# Patient Record
Sex: MALE | URBAN - METROPOLITAN AREA
[De-identification: names, ages, dates, MRNs, and addresses within clinical notes are randomized per-mention and may not be internally consistent; named-entity substitution may affect disease eponyms.]

---

## 2023-10-03 VITALS — BODY MASS INDEX: 30.2 KG/M2 | HEIGHT: 72 IN | WEIGHT: 223 LBS

## 2023-10-03 RX ORDER — METOCLOPRAMIDE 10 MG/1
10 TABLET ORAL ONCE
OUTPATIENT
Start: 2023-10-03 | End: 2023-10-03

## 2023-10-03 RX ORDER — VALSARTAN 40 MG/1
40 TABLET ORAL DAILY
COMMUNITY

## 2023-10-03 RX ORDER — FAMOTIDINE 20 MG/1
20 TABLET, FILM COATED ORAL ONCE
OUTPATIENT
Start: 2023-10-03 | End: 2023-10-03

## 2023-10-03 RX ORDER — NICOTINE POLACRILEX 4 MG
15 LOZENGE BUCCAL
OUTPATIENT
Start: 2023-10-03

## 2023-10-03 RX ORDER — SODIUM CHLORIDE, SODIUM LACTATE, POTASSIUM CHLORIDE, CALCIUM CHLORIDE 600; 310; 30; 20 MG/100ML; MG/100ML; MG/100ML; MG/100ML
INJECTION, SOLUTION INTRAVENOUS CONTINUOUS
OUTPATIENT
Start: 2023-10-03

## 2023-10-03 RX ORDER — NICOTINE POLACRILEX 4 MG
30 LOZENGE BUCCAL
OUTPATIENT
Start: 2023-10-03

## 2023-10-03 RX ORDER — DEXTROSE MONOHYDRATE 25 G/50ML
50 INJECTION, SOLUTION INTRAVENOUS
OUTPATIENT
Start: 2023-10-03

## 2023-10-06 RX ORDER — ACETAMINOPHEN 500 MG
1000 TABLET ORAL ONCE
Status: DISCONTINUED | OUTPATIENT
Start: 2023-10-06 | End: 2023-10-06

## 2023-10-09 ENCOUNTER — HOSPITAL ENCOUNTER (OUTPATIENT)
Dept: MRI IMAGING | Facility: HOSPITAL | Age: 48
Discharge: HOME | End: 2023-10-09
Attending: PAIN MEDICINE
Payer: MEDICAID

## 2023-10-14 VITALS — BODY MASS INDEX: 30.2 KG/M2 | WEIGHT: 223 LBS | HEIGHT: 72 IN

## 2023-10-14 RX ORDER — NICOTINE POLACRILEX 4 MG
15 LOZENGE BUCCAL
Status: CANCELLED | OUTPATIENT
Start: 2023-10-14

## 2023-10-14 RX ORDER — DEXTROSE MONOHYDRATE 25 G/50ML
50 INJECTION, SOLUTION INTRAVENOUS
Status: CANCELLED | OUTPATIENT
Start: 2023-10-14

## 2023-10-14 RX ORDER — SODIUM CHLORIDE, SODIUM LACTATE, POTASSIUM CHLORIDE, CALCIUM CHLORIDE 600; 310; 30; 20 MG/100ML; MG/100ML; MG/100ML; MG/100ML
INJECTION, SOLUTION INTRAVENOUS CONTINUOUS
Status: CANCELLED | OUTPATIENT
Start: 2023-10-14

## 2023-10-14 RX ORDER — NICOTINE POLACRILEX 4 MG
30 LOZENGE BUCCAL
Status: CANCELLED | OUTPATIENT
Start: 2023-10-14

## 2023-10-19 ENCOUNTER — HOSPITAL ENCOUNTER (OUTPATIENT)
Dept: MRI IMAGING | Facility: HOSPITAL | Age: 48
Discharge: HOME | End: 2023-10-19
Attending: PAIN MEDICINE
Payer: MEDICAID

## 2023-12-18 VITALS — WEIGHT: 225 LBS | HEIGHT: 72 IN | BODY MASS INDEX: 30.48 KG/M2

## 2023-12-18 RX ORDER — DEXTROSE MONOHYDRATE 25 G/50ML
50 INJECTION, SOLUTION INTRAVENOUS
OUTPATIENT
Start: 2023-12-18

## 2023-12-18 RX ORDER — NICOTINE POLACRILEX 4 MG
15 LOZENGE BUCCAL
OUTPATIENT
Start: 2023-12-18

## 2023-12-18 RX ORDER — SODIUM CHLORIDE 9 MG/ML
INJECTION, SOLUTION INTRAVENOUS CONTINUOUS
OUTPATIENT
Start: 2023-12-18

## 2023-12-18 RX ORDER — NICOTINE POLACRILEX 4 MG
30 LOZENGE BUCCAL
OUTPATIENT
Start: 2023-12-18

## 2023-12-29 ENCOUNTER — HOSPITAL ENCOUNTER (OUTPATIENT)
Dept: MRI IMAGING | Facility: HOSPITAL | Age: 48
Discharge: HOME | End: 2023-12-29
Attending: PAIN MEDICINE
Payer: MEDICAID

## 2023-12-29 DIAGNOSIS — Z01.818 PRE-OP TESTING: Primary | ICD-10-CM

## 2024-05-13 ENCOUNTER — OFFICE VISIT (OUTPATIENT)
Facility: CLINIC | Age: 49
End: 2024-05-13
Payer: MEDICAID

## 2024-05-13 ENCOUNTER — HOSPITAL ENCOUNTER (OUTPATIENT)
Dept: GENERAL RADIOLOGY | Age: 49
Discharge: HOME | End: 2024-05-13
Attending: FAMILY MEDICINE

## 2024-05-13 VITALS — HEIGHT: 72 IN | BODY MASS INDEX: 29.8 KG/M2 | WEIGHT: 220 LBS

## 2024-05-13 DIAGNOSIS — M51.16 LUMBAR DISC HERNIATION WITH RADICULOPATHY: ICD-10-CM

## 2024-05-13 DIAGNOSIS — M54.50 LOW BACK PAIN, UNSPECIFIED BACK PAIN LATERALITY, UNSPECIFIED CHRONICITY, UNSPECIFIED WHETHER SCIATICA PRESENT: ICD-10-CM

## 2024-05-13 DIAGNOSIS — M54.16 LEFT LUMBAR RADICULOPATHY: Primary | ICD-10-CM

## 2024-05-13 PROCEDURE — 72100 X-RAY EXAM L-S SPINE 2/3 VWS: CPT | Performed by: FAMILY MEDICINE

## 2024-05-13 RX ORDER — DIAZEPAM 5 MG/1
5 TABLET ORAL
Qty: 2 TABLET | Refills: 0 | Status: SHIPPED | OUTPATIENT
Start: 2024-05-13 | End: 2024-05-13

## 2024-05-13 RX ORDER — PREDNISONE 20 MG/1
20 TABLET ORAL DAILY
Qty: 7 TABLET | Refills: 0 | Status: SHIPPED | OUTPATIENT
Start: 2024-05-13 | End: 2024-05-20

## 2024-05-13 NOTE — H&P
Sports Medicine Clinic Note     Subjective:    Chief Complaint   Patient presents with    Back Pain     SCIATICA, RECENT ONSET: 05/06.2023     History of Present Illness: 49-year-old male presents with a history of left-sided radicular leg pain that has been ongoing for over a year, with no specific inciting event or trauma. The patient has previously sought medical advice and was recommended an MRI after failing to improve with physical therapy, though he never underwent the [place] the [place] as his symptoms temporarily improved. Recently, he reports severe left-sided radicular pain accompanied by weakness and numbness in his entire left leg, persisting for the past 6 months. He underwent an MRI in [place], which revealed a disc herniation. He was advised to undergo surgery as soon as possible. He opted to pursue surgical options in the [place] and has brought physical films and an MRI report from [facility] for review.    Objective:    Left Lower Extremity Examination:    Inspection:  The patient ambulates with a slight limp, favoring the right leg.    Palpation:  No palpable masses or deformity. Tenderness localized to the lumbar paraspinal region.    Range of Motion:  Limited due to pain, particularly during lumbar flexion and left lateral bending.    Neurovascular:  Diminished patellar and Achilles reflexes on the left side compared to the right. 4/5 strength in the left lower extremity notably weak in hip flexion abduction extension as well as ankle plantar and dorsiflexion. Sensation diminished to soft touch in the entire LLE compared to right.    Special Tests:  Positive bilateral straight leg raise test with left sided radicular symptoms.    Diagnostic Tests:    Lumbar radiographs obtained today show no fracture or dislocation, no subluxation or disc space narrowing notable on radiographs.  Curvature is within normal limits.  There are some sclerosis of the lower lumbar facet joints suggestive of facet  arthropathy.    Review of the physical MRI films from [place] in the office confirms the presence of a large, diffuse disc bulge at L4-L5 encroaching the lateral recesses bilaterally and abutting the exiting nerve root causing spinal canal stenosis. Similar findings noted at L5-S1.    Assessment:    Lumbar Disc Herniation with Radiculopathy. The chronicity and severity of symptoms, along with chronic neurological deficits, are concerning for permanent damage if not promptly addressed.    Plan:    Additional Imaging/Workup:  Order stat lumbar MRI to assess current status and plan surgical intervention if necessary.    Medications:  Prescribe a prednisone burst to manage inflammation and acute symptoms.    Bracing:  Consider a lumbar support brace to alleviate discomfort and stabilize the spine during activities.    Procedures:  Prepare for possible surgical intervention based on MRI findings and specialist consultation.    Activity Recommendations:  Advise the patient to avoid activities that exacerbate pain, particularly heavy lifting and prolonged sitting or standing. Gentle, guided stretching and strengthening exercises as tolerated.    Follow-Up:  Schedule a follow-up appointment immediately after the MRI results are available, or sooner if symptoms worsen. Arrange referral to a spine surgeon for surgical evaluation based on the MRI findings.        Mesfin Torres DO, CAM   Primary Care Sports Medicine    Department of Orthopaedic Surgery  [facility]    [address] [place] [address]   [address] [place] [address]    t: [phone]  f: [phone]      [facility]

## 2024-05-16 ENCOUNTER — PATIENT MESSAGE (OUTPATIENT)
Facility: CLINIC | Age: 49
End: 2024-05-16

## 2024-05-16 NOTE — TELEPHONE ENCOUNTER
From: Aric Jackson  To: Mesfin MONDRAGON  Sent: 5/16/2024 3:19 PM CDT  Subject: Upcoming MRI    Hi Dr. Torres,    I have an MRI scheduled at [facility] this coming Monday. They need the notes from our appointment for insurance. Can you please have them faxed to [phone]?    Thank you!

## 2024-05-17 ENCOUNTER — TELEPHONE (OUTPATIENT)
Dept: ORTHOPEDICS CLINIC | Facility: CLINIC | Age: 49
End: 2024-05-17

## 2024-05-17 NOTE — TELEPHONE ENCOUNTER
Received fax from [facility] requesting medical records in order to obtain auth from insurance for patient scheduled MRI. 4 pages of records were faxed, and were received

## 2024-05-22 ENCOUNTER — PATIENT MESSAGE (OUTPATIENT)
Facility: CLINIC | Age: 49
End: 2024-05-22

## 2024-05-22 NOTE — TELEPHONE ENCOUNTER
From: Aric Jackson  To: Mesfin MONDRAGON  Sent: 5/22/2024 2:58 PM CDT  Subject: MRI Results    Hello,    I had my MRI on May 20th. I believe [facility] should have sent the findings so I just wanted to double check that you guys received them. I have attached them as well just in case.

## 2024-05-31 ENCOUNTER — OFFICE VISIT (OUTPATIENT)
Facility: CLINIC | Age: 49
End: 2024-05-31
Payer: MEDICAID

## 2024-05-31 ENCOUNTER — PATIENT MESSAGE (OUTPATIENT)
Facility: CLINIC | Age: 49
End: 2024-05-31

## 2024-05-31 VITALS — HEIGHT: 72 IN | WEIGHT: 220 LBS | BODY MASS INDEX: 29.8 KG/M2

## 2024-05-31 DIAGNOSIS — M54.16 LEFT LUMBAR RADICULOPATHY: Primary | ICD-10-CM

## 2024-05-31 DIAGNOSIS — M51.16 LUMBAR DISC HERNIATION WITH RADICULOPATHY: ICD-10-CM

## 2024-05-31 PROCEDURE — 99214 OFFICE O/P EST MOD 30 MIN: CPT | Performed by: FAMILY MEDICINE

## 2024-05-31 RX ORDER — ATORVASTATIN CALCIUM 10 MG/1
10 TABLET, FILM COATED ORAL NIGHTLY
COMMUNITY
Start: 2024-05-03

## 2024-05-31 RX ORDER — BLOOD-GLUCOSE METER
EACH MISCELLANEOUS
COMMUNITY
Start: 2024-05-30

## 2024-05-31 NOTE — TELEPHONE ENCOUNTER
From: Aric Jackson  To: Mesfin MONDRAGON  Sent: 5/31/2024 11:13 AM CDT  Subject: Running Late    Hello,     I am running slightly late as I am coming from [place]. I should not be more than 10 minutes late but I just wanted to let you know in case.     Thank you,  Aric Jackson

## 2024-05-31 NOTE — PROGRESS NOTES
Sports Medicine Clinic Note    Subjective:    Chief Complaint: Follow-up for MRI results and ongoing low back pain radiating into the left lower extremity.    Interval History: Aric Jackson is a 49-year-old male who returns for follow-up regarding his low back pain with left-sided radiculopathy. Since the last visit, he reports persistent and severe left-sided leg pain, weakness, and numbness. He has been taking the prescribed prednisone, which provided mild relief in pain but not neurologic symptoms or weakness. No new injuries or incidents reported. He continues to experience difficulty with daily activities due to pain and weakness in the left leg.    Objective:    Left Lower Extremity Examination:    Inspection:  The patient ambulates with a slight limp, favoring the right leg. No visible swelling or deformity.    Palpation:  Tenderness localized to the lumbar paraspinal region. No palpable masses or deformity noted.    Range of Motion:  Limited due to pain, particularly during lumbar flexion and left lateral bending.    Neurovascular:  Diminished patellar and Achilles reflexes on the left side compared to the right. Strength is 4/5 in the left lower extremity, with notable weakness in hip flexion, abduction, extension, and ankle plantar and dorsiflexion. Sensation diminished to soft touch in the entire left lower extremity compared to the right.    Special Tests:  Positive bilateral straight leg raise test with left-sided radicular symptoms.    Diagnostic Tests:    Review of recent MRI findings from 05/20/2024:  - Lumbar vertebrae normal in height and alignment.  - Mild reduction of disc height and signal with mild degenerative endplate changes and small marginal osteophytes throughout the lumbar spine.  - At L4-L5, central disc protrusion with annular disc fissure, mild to moderate canal stenosis, and moderate right and mild to moderate left foraminal stenosis.  - At L5-S1, left paracentral and subarticular  disc protrusion with annular disc fissure, contacting the left S1 nerve root, mild facet arthropathy, and mild to moderate left and mild right foraminal stenosis.    Assessment:    Lumbar Disc Herniation with Radiculopathy, primarily at L4-L5 and L5-S1 levels, causing moderate foraminal stenosis and likely impingement of the left S1 nerve root.    Plan:    Procedures:  Refer to a spine surgeon for evaluation and potential surgical intervention based on current MRI findings. Discuss possible discectomy or decompression surgery.    Additional Imaging/Workup:  Not indicated at this time.    Medications:  Continue prednisone burst as needed for acute symptom management. Can try neuromodulating medicine at night to help with sleep in interim.    Bracing/Casting:  Can try lumbar support brace to alleviate discomfort and stabilize the spine during activities.    Activity Recommendations:  Advise the patient to avoid activities that exacerbate pain, particularly heavy lifting and prolonged sitting or standing. Gentle, guided stretching and strengthening exercises as tolerated.    Follow-Up:  Referral to spine surgery specialist to be completed promptly for further evaluation and management.        Mesfin Torres DO, CAQSM   Primary Care Sports Medicine    Department of Orthopaedic Surgery  [facility]    [address] [place] [address]  [address] [place] [address]    t: [phone]  f: [phone]      [facility]

## 2024-06-06 ENCOUNTER — OFFICE VISIT (OUTPATIENT)
Dept: ORTHOPEDICS CLINIC | Facility: CLINIC | Age: 49
End: 2024-06-06
Payer: MEDICAID

## 2024-06-06 VITALS — HEIGHT: 72 IN | BODY MASS INDEX: 29.8 KG/M2 | WEIGHT: 220 LBS

## 2024-06-06 DIAGNOSIS — M51.16 LUMBAR DISC HERNIATION WITH RADICULOPATHY: Primary | ICD-10-CM

## 2024-06-06 PROCEDURE — 99205 OFFICE O/P NEW HI 60 MIN: CPT | Performed by: STUDENT IN AN ORGANIZED HEALTH CARE EDUCATION/TRAINING PROGRAM

## 2024-06-06 RX ORDER — GABAPENTIN 300 MG/1
300 CAPSULE ORAL NIGHTLY
Qty: 30 CAPSULE | Refills: 0 | Status: SHIPPED | OUTPATIENT
Start: 2024-06-06 | End: 2024-07-06

## 2024-06-06 NOTE — H&P
[facility] - ORTHOPEDICS  [address], [address] [address]  [address] [place] [address]  [phone]     NEW PATIENT VISIT - HISTORY AND PHYSICAL EXAMINATION     Name: Aric Jackson   MRN: [id number]  Date: 06/06/24       CC: back and left leg pain/numbness/weakness    REFERRED BY: Ortiz Handley DO    HPI:   Aric Jackson is a very pleasant 49 year old male who presents today for evaluation of back and leg pain. The distribution of symptoms are: 20% backpain and 80% leg pain. The symptoms began many year(s) ago without any significant injury. Since the onset, the symptoms have wax and waned over time. Patient feels pain is aggravated by bending and improved by repositioning. The patient reports  numbness and  weakness.  The symptom characteristics are as follows: Patient is a 49-year-old male presenting with low back pain radiating down left lower extremity, associated with numbness, tingling, weakness in the left foot.  Patient symptoms started 3 years ago and has had multiple flareup episodes.  Symptoms have been ongoing for the past year.  Patient had physical therapy in September of last year with resolution.  Patient had been evaluated by pain clinic as well as Dr. Blackburn.   Has persistent numbness in left L4 and L5 nerve distributions, associated with foot weakness.     Prior spine surgery: none.    Bowel and bladder symptoms: absent.    The patient has not had issues with balance and/or hand dexterity problems such as changes in penmanship or the use of buttons or zippers.    Treatment up to this time has included:    Evaluation: PCP  NSAIDS: have been partially helpful  Narcotic use: None  Physical therapy: last year  Spinal injections: None  Others:       PMH:   Past Medical History:    Back problem    Diabetes (HCC)    High blood pressure    High cholesterol       PAST SURGICAL HX:  History reviewed. No pertinent surgical history.    FAMILY  HX:  History reviewed. No pertinent family history.    ALLERGIES:  Patient has no known allergies.    MEDICATIONS:   Current Outpatient Medications   Medication Sig Dispense Refill    gabapentin 300 MG Oral Cap Take 1 capsule (300 mg total) by mouth nightly. 30 capsule 0    atorvastatin 10 MG Oral Tab Take 1 tablet (10 mg total) by mouth nightly.      Blood Glucose Monitoring Suppl (ONETOUCH ULTRA 2) w/Device Does not apply Kit       metFORMIN HCl 1000 MG Oral Tab Take 1 tablet (1,000 mg total) by mouth 2 (two) times daily with meals.      valsartan 40 MG Oral Tab Take 1 tablet (40 mg total) by mouth daily.         ROS: A comprehensive 14 point review of systems was performed and was negative aside from the aforementioned per history of present illness.    SOCIAL HX:  Social History     Tobacco Use    Smoking status: Never    Smokeless tobacco: Never   Substance Use Topics    Alcohol use: Not Currently         PE:   Vitals:    06/06/24 1330   Weight: 220 lb (99.8 kg)   Height: 6' (1.829 m)     Estimated body mass index is 29.84 kg/m² as calculated from the following:    Height as of this encounter: 6' (1.829 m).    Weight as of this encounter: 220 lb (99.8 kg).    Physical Exam  Constitutional:       Appearance: Normal appearance.   HENT:      Head: Normocephalic and atraumatic.   Eyes:      Extraocular Movements: Extraocular movements intact.   Cardiovascular:      Pulses: Normal pulses. Skin warm and well perfused.  Pulmonary:      Effort: Pulmonary effort is normal. No respiratory distress.   Skin:     General: Skin is warm.   Psychiatric:         Mood and Affect: Mood normal.     Spine Exam:    Normal gait without difficulty  Able to heel, toe, tandem gait without difficulty  Level shoulders and hips in even stance    Restricted L-spine ROM    No tenderness to palpation of L-spine    Straight leg raise test: negative    Sustained clonus: negative    LE Strength: 5/5 IP QUAD TA EHL GSC on the right, 4/5 TA, EHL  on the left  LE Sensation: decreased in left L4 and L5 distributions, otherwise normal in L2-S1 distribution  LE reflexes: normal    Radiographic Examination/Diagnostics:  MRI personally viewed, independently interpreted and radiology report was reviewed.  MRI of the lumbar spine demonstrates central disc herniation at L4-5 with moderate canal stenosis as well as lateral recess stenosis    IMPRESSION: Aric Jackson is a 49 year old male with lumbar stenosis and radiculopathy    PLAN:     We reviewed the patients history, symptoms, exam findings, and imaging today.  We had a detailed discussion outlining the etiology, anatomy, pathophysiology, and natural history of lumbar radiculopathy. The typical management of this condition may include lifestyle modification, NSAIDs, physical therapy, oral steroids, epidural injections, neuromodulatory medications, and sometimes pain medications.     The patient has participated in extensive conservative management and has failed gain any significant improvement in his symptoms over the last 12 months.  The symptoms have failed to respond to conservative measures for greater than 3 months now, to include: prescription strength analgesics and active documented physical therapy or therapeutic exercises including stretching and strengthening.  The patient does not have any cognitive or behavioral issues requiring further evaluation or management.      In addition to the above, he has significant functional impairment and is unable to perform activities of daily living.      We discussed the risks, benefits, and alternatives of treatment.  The patient fully understands the nature of his medical condition and fully understands the nature of the proposed surgical treatment.  I have fully explained all possible alternative treatments or procedures and the patient understands the significant risks involved in the proposed treatment, as well as the risks involved and benefits of all  alternative treatments and procedures.     Plan: Left L4-5 decompression    Patient can call to schedule      Ricardo Jones MD  Orthopedic Spine Surgeon  [facility]   [address] [place] [address], [address], [address].[facility]  [email or web address]  t: [phone]   f: [phone]        This note was dictated using Dragon software.  While it was briefly proofread prior to completion, some grammatical, spelling, and word choice errors due to dictation may still occur.

## 2024-06-07 ENCOUNTER — TELEPHONE (OUTPATIENT)
Dept: ORTHOPEDICS CLINIC | Facility: CLINIC | Age: 49
End: 2024-06-07

## 2024-06-07 ENCOUNTER — MED REC SCAN ONLY (OUTPATIENT)
Dept: ORTHOPEDICS CLINIC | Facility: CLINIC | Age: 49
End: 2024-06-07

## 2024-06-07 DIAGNOSIS — M51.16 LUMBAR DISC HERNIATION WITH RADICULOPATHY: Primary | ICD-10-CM

## 2024-06-07 NOTE — TELEPHONE ENCOUNTER
SURGERY SCHEDULING SHEET    Aric Jackson  1/1/1975  [id number]    Procedure: Left L4-5 laminotomy, discectomy (55085)    Diagnosis: Lumbar stenosis with radiculopathy    Anesthesia: General    Length of Surgery: 2 hrs    Disposition: Outpatient    Assist: ALANNA Segura    OR Table: Doc    Position: Prone    Implant:  None    C-Arm: Yes    Special Equipment: None    Neuromonitoring: None    Pre-op Testing: PER ANESTHESIA GUIDELINES    Clearance: OTHER:  PCP    Post op: 2 weeks post op    Home health: [facility]    Ricardo Jones MD  [facility] Orthopedic Surgery  Phone: [phone]  Fax: [phone]

## 2024-06-07 NOTE — TELEPHONE ENCOUNTER
Date of Surgery: 7/3/24       Post Op Appt:  7/18/24 @0230    Case ID: [id number]    Notes: RH : YES    Navigator Appt: 06/21/24 @ 1115      SURGERY SCHEDULING SHEET     Aric Jackson  1/1/1975  [id number]     Procedure: Left L4-5 laminotomy, discectomy (00159)     Diagnosis: Lumbar stenosis with radiculopathy     Anesthesia: General     Length of Surgery: 2 hrs     Disposition: Outpatient     Assist: ALANNA Segura     OR Table: Doc     Position: Prone     Implant:  None     C-Arm: Yes     Special Equipment: None     Neuromonitoring: None     Pre-op Testing: PER ANESTHESIA GUIDELINES     Clearance: OTHER:  PCP     Post op: 2 weeks post op     Home health: [facility]     Ricardo Jones MD  [facility] Orthopedic Surgery  Phone: [phone]  Fax: [phone]

## 2024-06-07 NOTE — TELEPHONE ENCOUNTER
Spoke with Aric's daughter Belkys in regards to getting him scheduled for surgery. I did discuss optional surgical dates with her. She did decide to proceed with surgery for him on 7/3/24. I confirmed that surgery will take place at [facility]. I also discussed the surgical protocol and scheduled his post op and spine navigator appt. She states understanding with no questions or concerns. Advised to call the office back if they have any questions or concerns.         Can you send the booking sheet over please?

## 2024-06-13 ENCOUNTER — TELEPHONE (OUTPATIENT)
Dept: FAMILY MEDICINE CLINIC | Facility: CLINIC | Age: 49
End: 2024-06-13

## 2024-06-21 ENCOUNTER — LABORATORY ENCOUNTER (OUTPATIENT)
Dept: LAB | Age: 49
End: 2024-06-21
Attending: STUDENT IN AN ORGANIZED HEALTH CARE EDUCATION/TRAINING PROGRAM

## 2024-06-21 ENCOUNTER — NURSE ONLY (OUTPATIENT)
Dept: INTERNAL MEDICINE CLINIC | Facility: CLINIC | Age: 49
End: 2024-06-21

## 2024-06-21 ENCOUNTER — TELEPHONE (OUTPATIENT)
Dept: INTERNAL MEDICINE CLINIC | Facility: CLINIC | Age: 49
End: 2024-06-21

## 2024-06-21 ENCOUNTER — NURSE ONLY (OUTPATIENT)
Dept: SURGERY | Facility: CLINIC | Age: 49
End: 2024-06-21

## 2024-06-21 ENCOUNTER — OFFICE VISIT (OUTPATIENT)
Dept: INTERNAL MEDICINE CLINIC | Facility: CLINIC | Age: 49
End: 2024-06-21

## 2024-06-21 VITALS
HEART RATE: 79 BPM | HEIGHT: 72 IN | WEIGHT: 231 LBS | SYSTOLIC BLOOD PRESSURE: 119 MMHG | BODY MASS INDEX: 31.29 KG/M2 | DIASTOLIC BLOOD PRESSURE: 82 MMHG

## 2024-06-21 DIAGNOSIS — Z71.9 ENCOUNTER FOR EDUCATION: Primary | ICD-10-CM

## 2024-06-21 DIAGNOSIS — M51.16 LUMBAR DISC HERNIATION WITH RADICULOPATHY: ICD-10-CM

## 2024-06-21 DIAGNOSIS — E66.09 CLASS 1 OBESITY DUE TO EXCESS CALORIES WITHOUT SERIOUS COMORBIDITY WITH BODY MASS INDEX (BMI) OF 31.0 TO 31.9 IN ADULT: ICD-10-CM

## 2024-06-21 DIAGNOSIS — Z00.00 ANNUAL PHYSICAL EXAM: ICD-10-CM

## 2024-06-21 DIAGNOSIS — E53.8 VITAMIN B12 DEFICIENCY: ICD-10-CM

## 2024-06-21 DIAGNOSIS — E55.9 VITAMIN D DEFICIENCY: ICD-10-CM

## 2024-06-21 DIAGNOSIS — E11.59 HYPERTENSION ASSOCIATED WITH DIABETES (HCC): ICD-10-CM

## 2024-06-21 DIAGNOSIS — R09.89 FIRM LYMPH NODE: ICD-10-CM

## 2024-06-21 DIAGNOSIS — E11.69 TYPE 2 DIABETES MELLITUS WITH OTHER SPECIFIED COMPLICATION, UNSPECIFIED WHETHER LONG TERM INSULIN USE (HCC): ICD-10-CM

## 2024-06-21 DIAGNOSIS — E11.69 HYPERLIPIDEMIA ASSOCIATED WITH TYPE 2 DIABETES MELLITUS (HCC): ICD-10-CM

## 2024-06-21 DIAGNOSIS — E11.9 TYPE 2 DIABETES MELLITUS WITHOUT COMPLICATION, WITHOUT LONG-TERM CURRENT USE OF INSULIN (HCC): ICD-10-CM

## 2024-06-21 DIAGNOSIS — E78.5 HYPERLIPIDEMIA ASSOCIATED WITH TYPE 2 DIABETES MELLITUS (HCC): ICD-10-CM

## 2024-06-21 DIAGNOSIS — M54.16 SPINAL STENOSIS OF LUMBAR REGION WITH RADICULOPATHY: ICD-10-CM

## 2024-06-21 DIAGNOSIS — M48.061 SPINAL STENOSIS OF LUMBAR REGION WITH RADICULOPATHY: ICD-10-CM

## 2024-06-21 DIAGNOSIS — Z76.89 ENCOUNTER TO ESTABLISH CARE WITH NEW DOCTOR: Primary | ICD-10-CM

## 2024-06-21 DIAGNOSIS — I15.2 HYPERTENSION ASSOCIATED WITH DIABETES (HCC): ICD-10-CM

## 2024-06-21 DIAGNOSIS — Z12.11 ENCOUNTER FOR SCREENING COLONOSCOPY: ICD-10-CM

## 2024-06-21 PROBLEM — E66.811 CLASS 1 OBESITY DUE TO EXCESS CALORIES WITHOUT SERIOUS COMORBIDITY WITH BODY MASS INDEX (BMI) OF 31.0 TO 31.9 IN ADULT: Status: ACTIVE | Noted: 2024-06-21

## 2024-06-21 LAB
ALBUMIN SERPL-MCNC: 4.7 G/DL (ref 3.2–4.8)
ALBUMIN/GLOB SERPL: 1.6 {RATIO} (ref 1–2)
ALP LIVER SERPL-CCNC: 54 U/L
ALT SERPL-CCNC: 40 U/L
ANION GAP SERPL CALC-SCNC: 7 MMOL/L (ref 0–18)
ANTIBODY SCREEN: NEGATIVE
APTT PPP: 30.2 SECONDS (ref 23–36)
AST SERPL-CCNC: 22 U/L (ref ?–34)
ATRIAL RATE: 67 BPM
BASOPHILS # BLD AUTO: 0.07 X10(3) UL (ref 0–0.2)
BASOPHILS NFR BLD AUTO: 0.8 %
BILIRUB SERPL-MCNC: 0.4 MG/DL (ref 0.3–1.2)
BUN BLD-MCNC: 13 MG/DL (ref 9–23)
BUN/CREAT SERPL: 13.1 (ref 10–20)
CALCIUM BLD-MCNC: 10.1 MG/DL (ref 8.7–10.4)
CARTRIDGE EXPIRATION DATE: ABNORMAL DATE
CHLORIDE SERPL-SCNC: 108 MMOL/L (ref 98–112)
CHOLEST SERPL-MCNC: 137 MG/DL (ref ?–200)
CO2 SERPL-SCNC: 26 MMOL/L (ref 21–32)
CREAT BLD-MCNC: 0.99 MG/DL
CREAT UR-SCNC: 82.3 MG/DL
DEPRECATED RDW RBC AUTO: 43.7 FL (ref 35.1–46.3)
EGFRCR SERPLBLD CKD-EPI 2021: 92 ML/MIN/1.73M2 (ref 60–?)
EOSINOPHIL # BLD AUTO: 0.33 X10(3) UL (ref 0–0.7)
EOSINOPHIL NFR BLD AUTO: 4 %
ERYTHROCYTE [DISTWIDTH] IN BLOOD BY AUTOMATED COUNT: 13.3 % (ref 11–15)
FASTING PATIENT LIPID ANSWER: YES
FASTING STATUS PATIENT QL REPORTED: YES
GLOBULIN PLAS-MCNC: 2.9 G/DL (ref 2–3.5)
GLUCOSE BLD-MCNC: 153 MG/DL (ref 70–99)
HCT VFR BLD AUTO: 44.1 %
HDLC SERPL-MCNC: 45 MG/DL (ref 40–59)
HEMOGLOBIN A1C: 7.5 % (ref 4.3–5.6)
HGB BLD-MCNC: 15 G/DL
IMM GRANULOCYTES # BLD AUTO: 0.02 X10(3) UL (ref 0–1)
IMM GRANULOCYTES NFR BLD: 0.2 %
INR BLD: 1 (ref 0.8–1.2)
LDLC SERPL CALC-MCNC: 78 MG/DL (ref ?–100)
LYMPHOCYTES # BLD AUTO: 3.55 X10(3) UL (ref 1–4)
LYMPHOCYTES NFR BLD AUTO: 42.7 %
MCH RBC QN AUTO: 30.5 PG (ref 26–34)
MCHC RBC AUTO-ENTMCNC: 34 G/DL (ref 31–37)
MCV RBC AUTO: 89.8 FL
MICROALBUMIN UR-MCNC: <0.3 MG/DL
MONOCYTES # BLD AUTO: 0.6 X10(3) UL (ref 0.1–1)
MONOCYTES NFR BLD AUTO: 7.2 %
NEUTROPHILS # BLD AUTO: 3.74 X10 (3) UL (ref 1.5–7.7)
NEUTROPHILS # BLD AUTO: 3.74 X10(3) UL (ref 1.5–7.7)
NEUTROPHILS NFR BLD AUTO: 45.1 %
NONHDLC SERPL-MCNC: 92 MG/DL (ref ?–130)
OSMOLALITY SERPL CALC.SUM OF ELEC: 295 MOSM/KG (ref 275–295)
P AXIS: 27 DEGREES
P-R INTERVAL: 188 MS
PLATELET # BLD AUTO: 390 10(3)UL (ref 150–450)
POTASSIUM SERPL-SCNC: 4.5 MMOL/L (ref 3.5–5.1)
PROT SERPL-MCNC: 7.6 G/DL (ref 5.7–8.2)
PROTHROMBIN TIME: 13.8 SECONDS (ref 11.6–14.8)
Q-T INTERVAL: 366 MS
QRS DURATION: 92 MS
QTC CALCULATION (BEZET): 386 MS
R AXIS: 35 DEGREES
RBC # BLD AUTO: 4.91 X10(6)UL
RH BLOOD TYPE: POSITIVE
SODIUM SERPL-SCNC: 141 MMOL/L (ref 136–145)
T AXIS: 21 DEGREES
TRIGL SERPL-MCNC: 69 MG/DL (ref 30–149)
TSI SER-ACNC: 1.39 MIU/ML (ref 0.55–4.78)
VENTRICULAR RATE: 67 BPM
VIT B12 SERPL-MCNC: 385 PG/ML (ref 211–911)
VIT D+METAB SERPL-MCNC: 28.4 NG/ML (ref 30–100)
VLDLC SERPL CALC-MCNC: 11 MG/DL (ref 0–30)
WBC # BLD AUTO: 8.3 X10(3) UL (ref 4–11)

## 2024-06-21 PROCEDURE — 86900 BLOOD TYPING SEROLOGIC ABO: CPT

## 2024-06-21 PROCEDURE — 82607 VITAMIN B-12: CPT

## 2024-06-21 PROCEDURE — 82043 UR ALBUMIN QUANTITATIVE: CPT

## 2024-06-21 PROCEDURE — 84443 ASSAY THYROID STIM HORMONE: CPT

## 2024-06-21 PROCEDURE — 83036 HEMOGLOBIN GLYCOSYLATED A1C: CPT | Performed by: STUDENT IN AN ORGANIZED HEALTH CARE EDUCATION/TRAINING PROGRAM

## 2024-06-21 PROCEDURE — 82306 VITAMIN D 25 HYDROXY: CPT

## 2024-06-21 PROCEDURE — 36415 COLL VENOUS BLD VENIPUNCTURE: CPT

## 2024-06-21 PROCEDURE — 99386 PREV VISIT NEW AGE 40-64: CPT | Performed by: STUDENT IN AN ORGANIZED HEALTH CARE EDUCATION/TRAINING PROGRAM

## 2024-06-21 PROCEDURE — 86850 RBC ANTIBODY SCREEN: CPT

## 2024-06-21 PROCEDURE — 85025 COMPLETE CBC W/AUTO DIFF WBC: CPT

## 2024-06-21 PROCEDURE — 80061 LIPID PANEL: CPT

## 2024-06-21 PROCEDURE — 92229 IMG RTA DETC/MNTR DS POC ALY: CPT | Performed by: STUDENT IN AN ORGANIZED HEALTH CARE EDUCATION/TRAINING PROGRAM

## 2024-06-21 PROCEDURE — 86901 BLOOD TYPING SEROLOGIC RH(D): CPT

## 2024-06-21 PROCEDURE — 85610 PROTHROMBIN TIME: CPT

## 2024-06-21 PROCEDURE — 82570 ASSAY OF URINE CREATININE: CPT

## 2024-06-21 PROCEDURE — 80053 COMPREHEN METABOLIC PANEL: CPT

## 2024-06-21 PROCEDURE — 93005 ELECTROCARDIOGRAM TRACING: CPT

## 2024-06-21 PROCEDURE — 93010 ELECTROCARDIOGRAM REPORT: CPT | Performed by: INTERNAL MEDICINE

## 2024-06-21 PROCEDURE — 85730 THROMBOPLASTIN TIME PARTIAL: CPT

## 2024-06-21 RX ORDER — LANCETS 33 GAUGE
1 EACH MISCELLANEOUS 3 TIMES DAILY
COMMUNITY
Start: 2024-05-30

## 2024-06-21 RX ORDER — BLOOD SUGAR DIAGNOSTIC
STRIP MISCELLANEOUS 3 TIMES DAILY
COMMUNITY

## 2024-06-21 NOTE — TELEPHONE ENCOUNTER
Left message for patient,  He has appt today with Dr Norton at 10:20. Per Dr Norton we will need to do an est care/physical today. But we will not be able to do pre op in first visit. As he is a new patient to us. We can then schedule pre op next week. (I did receive pre op paperwork already) If pt calls back please let him know   Vermilion Border Text: The closure involved the vermilion border.

## 2024-06-21 NOTE — PATIENT INSTRUCTIONS
[employer]  What is Type 2 Diabetes?  Watch this clip to understand what happens within your body when you have type 2 diabetes, and the importance of keeping your blood glucose levels within a healthy range.  To watch the video:  Scan the QR code  Using your mobile device, scan the following code:  OR  Go to the website:  timeplazza  Enter the prescription code:  1576E    © 3144-1409 [employer]. All rights reserved. This information is not intended as a substitute for professional medical care. Always follow your healthcare professional's instructions.    Managing Type 2 Diabetes  Type 2 diabetes is a long-term (chronic) condition. Managing diabetes may mean making some tough changes. Yourhealthcare team can help you.  To manage type 2 diabetes, you'll need to balance your medicine with diet and activity. You will also need check your blood sugar. And work with yourhealthcare provider to prevent complications.    Take your medicine  You may take pills or give yourself insulin shots for diabetes. Or you may use both. Take your medicines or give yourself insulin at the right times to help you control your blood sugar. Think about ways that will help you remember to take your medicines the right way every day. Ask your healthcare provider orteam for ideas.  You may only take pills for your diabetes. But this may change. Over time, mostpeople with type 2 diabetes also need insulin.  Eat healthy  A healthy diet helps control the amount of sugar in your blood. It also helps you stay at a healthy weight. Or it helps you lose weight, if if you'reoverweight. Extra weight makes it harder to control diabetes.  Your healthcare team will help you create a plan that works for you. You don'thave to give up all the foods you like. Have meals and snacks with:  Vegetables  Fruits  Lean meats or other healthy proteins  Whole grains  Low- or nonfat dairy products     Be physically  active  Being active helps lower your blood sugar. Activity helps your body use insulinto turn food into energy. It also helps you manage your weight:  Ask your healthcare provider to help you to make an activity program that's right for you. Your program is based on your age, general health, and types of activity you enjoy. Start slow. But aim for at least 150 minutes of exercise or activity each week. Start with 30 minutes a day. Exercise in 10-minute blocks. Don’t let more than 2 days go by without being active.  Check your blood sugar  Checking your own blood sugar may be a regular part of your care. Or you may only need to check your blood sugar from time to time. Your healthcare provider will tell you how to check your blood sugar at home. Checking it tells you if your blood sugar is in your target range. Having your blood sugars within thetarget range means that you are managing your diabetes well.  If your blood sugar levels are too high or too low, your healthcare provider may suggest changes to your diet or activity level. He or she may also adjustyour medicine.  Your healthcare provider may also tell you to check your blood sugar more oftenwhen you are sick.  Take care of yourself  When you have diabetes, you may be more likely to get other health problems. They include foot, eye, heart, nerve, and kidney problems. You can help prevent these problems by controlling your blood sugar and taking good care of yourself. Your healthcare provider, nurse, diabetes educator, and others canhelp you with the following:  Checkups. You should have regular checkups with your healthcare provider. At those visits, you will have a physical exam that includes checking your feet. Your healthcare provider will also check your blood pressure and weight. Take your shoes off before your appointment starts to be sure your feet are checked.  Other exams. You'll also need eye, foot, and dental exams at least once each year.  Lab  tests. You will have blood and urine tests:  Your healthcare provider will check your hemoglobin A1C at least twice a year. This blood test shows how well you have been controlling your blood sugar over 2 to 3 months. The results help your healthcare provider manage your diabetes.    You will also have other lab tests. For example, to check for kidney problems and abnormal cholesterol levels.  Smoking. If you smoke, you will need to quit. Smoking makes it more likely to get complications from diabetes. Ask your healthcare provider about ways to quit. Also don't use e-cigarette, or vaping, products.  Vaccines. Get a yearly flu shot. And ask your healthcare provider about vaccines to prevent pneumonia, shingles, and hepatitis B.  Stress and depression  Most people have challenges throughout their lives. Living with diabetes can increase your stress. Feeling stressed or depressed can actually affect yourblood sugar levels.  Tell your healthcare provider if you are having trouble coping with diabetes.He or she can help or refer you to other providers or programs.  To learn more  Know where you can get help. You can try the following:  Support. Ask family and friends to support your efforts to take care of yourself. Or look for a diabetes support group nearby or on the internet. Check the Connect with Others link on www.diabetes.org.  Counseling. Talk with a , psychologist, psychiatrist, or other counselor.  Information. Contact the American Diabetes Association at 241-682-5721 or www.diabetes.org  [place] last reviewed this educational content on11/1/2019    © 8411-6215 [employer]. All rights reserved. This information is not intended as a substitute for professional medical care. Always follow yourhealthcare professional's instructions.    Type 2 Diabetes and Food Choices  You make food choices every day. Whole wheat or white bread? A side of French fries or fresh fruit? Eat now or later?  Choices about what, when, and how much you eat affect your blood sugar (glucose), and also your blood pressure and cholesterol. Understanding how food affects blood glucose is the first step in managing diabetes. And following a diabetesmeal plan can help you keep your blood glucose levels on track.   Prevent problems  Having type 2 diabetes means that your body doesn’t control blood glucose well. When blood glucose stays too high for too long, serious health problems can develop. It's important to control your blood glucose through diet, exercise, and medicine. This can delay or prevent kidney,eye, nerve, and heart disease, and other complications of diabetes.   Control carbohydrates  Carbohydrates are foods that have the biggest effect on your blood glucose levels. After you eat carbohydrates, your blood glucose rises. Fruit, sweet foods and drinks, starchy foods (such as bread, potatoes, and rice), and milk and milk products contain carbohydrates. Carbohydrates are important for health. But when you eat too many at once, your blood glucose can go too high. This is even more likely if you don't have or take enough insulin forthat food.   Some carbohydrates may raise blood glucose more than others. These include potatoes, sweets, and white bread. Better choices are less processed foods with more fiber and nutrients. Good options are 100% whole-wheat bread, oatmeal,brown rice, and nonstarchy vegetables.   Learn to use food labels that show added sugar. And try to find healthierchoices, particularly if you are overweight.    Food and medicine  Insulin helps glucose move from the blood into your muscle cells, where it can be used for energy. Some diabetes medicines that are taken by mouth help you make more insulin. Or they help your insulin work more efficiently. So your medicines and food plan have to work together. If you take insulin shots, you need to be very careful to match the amount of carbohydrates you eat  with your insulin dose. If you have too many carbohydrates without adjusting your insulin dose, your blood glucose might become too high. If you have too few carbohydrates, your blood glucose might be too low. Your healthcare provider ora dietitian can help you match your food choices to your medicine.   Have a meal plan  With certain medicines, it's best to eat the same amount of food at the same time every day. That keeps your glucose levels stable. And it helps your medicine work best. Physical activity is an important way to control blood glucose, too. Try to exercise at the same time every day. That way you can build the extra calories you need for exercise into your meal plan. With othermedicines, you may have more choices about how much you eat and when.   If you want to change your medicine to better fit your lifestyle, talk withyour healthcare provider.   Eat smart  You can eat the same foods as everyone else, but you have to carefully watch for certain details. That’s where your diabetes meal plan comes in. A personal meal plan tells you the time of day to eat meals and snacks, the types of food to eat, and how much. Itshould include your favorite foods. And it should focus on these healthy foods:   Whole grains, such as 100% whole-wheat bread, brown rice, and oatmeal  Nonfat or low-fat dairy products, such as nonfat milk and yogurt (but be sure these products don't have sugar added to make up for the fat removed)  Lean meats, poultry, fish, eggs, and dried beans and peas  Foods and drinks with no added sugar  Fruits and vegetables  At first, it may be helpful to use measuring cups and spoons to make sure you’re really eating the amount of food that’s in your plan. You can also use standardized portion sizes on food labels, such as 1 serving of meat being the size of a deck of cards. By checking your blood glucose 1 to 2 hours after eating, you can learn how your food choicesaffect your blood glucose.   To  create a diabetes meal plan or change a plan that’s not working for you, see a dietitian or diabetes educator. Let them know if you have any new health concerns or if your medicines have changed. Having ameal plan that you can live with will keep you at your healthy best.     © 0020-6693 [employer]. All rights reserved. This information is not intended as a substitute for professional medical care. Always follow yourhealthcare professional's instructions.    Diabetes: Caring for Your Body   When you have diabetes, your body needs special care. This care helps you stay healthy and prevent problems. Exercise and healthy eating are a part of this. You can also protect yourself by taking special care of your feet, skin, teeth,and eyes.   Caring for your feet     Follow these tips to help keep your feet healthy:  Check your feet every day for redness, blisters, cracks, dry skin, or numbness. Use a mirror to check the bottoms of your feet, if needed. Or ask for help.  Wash your feet in warm (not hot) water. Don’t soak them.  Use an emery board to keep your toenails even with the ends of your toes. File away sharp edges. A foot doctor (podiatrist) may need to cut your toenails for you.  Smooth down calluses gently or wait until your next podiatry appointment.  Keep your skin soft and smooth by putting a thin layer of skin lotion on the tops and bottoms of your feet. Don't put lotion in between your toes.  Always wear shoes or slippers, even inside your home. Make sure that shoes are correctly fitted, not too tight and not too loose. This can cause friction and rubbing of your feet. Change your socks daily. Always check shoes for foreign objects before putting them on.  Call your healthcare provider right away if your feet are numb or painful. Also call your provider if a cut or sore doesn’t heal in a few days.  Preventing skin infections   To prevent skin infections, bathe every day, but use a moderate water  temperature.. Dry yourself well, especially between your toes. Try to keep your home on the humid side during the colder months of the year to prevent your skin getting dry. Wash any cuts with warm, soapy water. Cover with a sterile bandage. Call your healthcare provider if a cut or sore doesn't heal in a fewdays, feels warm, itches, is swollen, or has a bad smell.   Caring for your teeth   Follow these guidelines for healthy teeth:  Brush your teeth twice daily.  Floss your teeth daily.  See your dentist at least twice yearly.  Keep your blood sugar in a good range.  Caring for your eyes  Have your eyes checked every year by an optometrist or ophthalmologist. Letyour healthcare provider know if you experience any of the following symptoms:   Blurred vision  Dark spots or \"holes\"  Flashes of light  Seeing more floaters than usual  Poor night vision  If you smoke, quit  Smoking is dangerous for everyone, especially people with diabetes. It can harm the blood vessels in your eyes, kidneys, nerves, and heart. It raises blood pressure. Smoking can also slow healing, so infections are more likely. Askyour healthcare provider about programs to help you stop smoking.   [employer] last reviewed this educational content on12/1/2021 © 2000-2022 [employer]. All rights reserved. This information is not intended as a substitute for professional medical care. Always follow yourhealthcare professional's instructions.

## 2024-06-21 NOTE — PROGRESS NOTES
History of Present Illness   Patient ID: Aric Jackson is a 49 year old male.  Chief Complaint: Physical      Aric Jackson is a pleasant 49 year old male with PMHx of Type 2 DM, HLD, HTN Lumbar radiculop who presents to establish care and for annual physical exam. Aric Jackson is doing well today except for chronic left lower lumbar pain with radiculopathy.       A1C today is 7.5 on metoformin 1gm BID for +15 years. Back in [place] in 2023 was on glipizde-gliptan combination pill 5-100 but off for well over 1 year    Ranges at home 120-140 fasting.     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    Health Maintenance  - All care gaps addressed with patient.   Health Maintenance Due   Topic Date Due    Annual Physical  Never done    Diabetes Care Foot Exam  Never done    Diabetes Care A1C  Never done    Diabetes Care Dilated Eye Exam  Never done    Colorectal Cancer Screening  Never done    Diabetes Care: GFR  Never done    Diabetes Care: Microalb/Creat Ratio  Never done    Pneumococcal Vaccine: Birth to 64yrs (1 of 2 - PCV) Never done    PSA  Never done    COVID-19 Vaccine (1 - 2023-24 season) Never done     Review of Systems  Review of Systems   Constitutional: Negative.    Respiratory: Negative.     Cardiovascular: Negative.    Gastrointestinal: Negative.    Skin: Negative.    Neurological: Negative.        Physical Exam  Vitals:    06/21/24 1017   BP: 119/82   Pulse: 79   Weight: 231 lb (104.8 kg)   Height: 6' (1.829 m)     Body mass index is 31.33 kg/m².  BP Readings from Last 3 Encounters:   06/21/24 119/82     Physical Exam  Vitals reviewed.   Constitutional:       Appearance: He is well-developed.   HENT:      Head: Normocephalic and atraumatic.    Eyes:      Extraocular Movements: Extraocular movements intact.      Pupils: Pupils are equal, round, and reactive to light.   Cardiovascular:      Rate and Rhythm: Normal rate and regular rhythm.      Heart sounds: Normal heart sounds.   Pulmonary:      Effort: Pulmonary effort is normal.      Breath sounds: Normal breath sounds.   Abdominal:      General: Bowel sounds are normal.      Palpations: Abdomen is soft.      Tenderness: There is no abdominal tenderness.   Musculoskeletal:         General: Normal range of motion.   Feet:      Right foot:      Protective Sensation: 5 sites tested.  5 sites sensed.      Skin integrity: Skin integrity normal.      Left foot:      Protective Sensation: 5 sites tested.  5 sites sensed.      Skin integrity: Skin integrity normal.      Comments: Bilateral barefoot skin diabetic exam is normal, visualized feet and the appearance is normal.  Bilateral monofilament/sensation of both feet is normal.  Pulsation pedal pulse exam of both lower legs/feet is normal as well.      Bilateral barefoot skin diabetic exam is abnormal with dystrophic nails and/or dry skin   Skin:     General: Skin is warm and dry.   Neurological:      Mental Status: He is alert and oriented to person, place, and time.      Deep Tendon Reflexes: Reflexes are normal and symmetric.           Labs & Imaging  Pertinent labs and imaging reviewed.   No results found for: \"GLU\", \"BUN\", \"BUNCREA\", \"CREATSERUM\", \"ANIONGAP\", \"GFR\", \"GFRNAA\", \"GFRAA\", \"CA\", \"OSMOCALC\", \"ALKPHO\", \"AST\", \"ALT\", \"ALKPHOS\", \"BILT\", \"TP\", \"ALB\", \"GLOBULIN\", \"AGRATIO\", \"NA\", \"K\", \"CL\", \"CO2\"  Lab Results   Component Value Date    A1C 7.5 (A) 06/21/2024     No results found for: \"WBC\", \"RBC\", \"HGB\", \"HCT\", \"MCV\", \"MCH\", \"MCHC\", \"RDW\", \"PLT\", \"MPV\"  No results found for: \"CHOLEST\", \"TRIG\", \"HDL\", \"LDL\", \"VLDL\", \"TCHDLRATIO\", \"NONHDLC\", \"CHOLHDLRATIO\", \"CALCNONHDL\"  The ASCVD Risk score (Sheryl BADILLO, et al., 2019) failed to calculate for the  following reasons:    Cannot find a previous HDL lab    Cannot find a previous total cholesterol lab    Medical History    Reviewed allergies:  No Known Allergies     Reviewed:  Patient Active Problem List    Diagnosis    Spinal stenosis of lumbar region with radiculopathy    Class 1 obesity due to excess calories without serious comorbidity with body mass index (BMI) of 31.0 to 31.9 in adult    Vitamin B12 deficiency    Vitamin D deficiency    Type 2 diabetes mellitus without complication, without long-term current use of insulin (HCC)    Hypertension associated with diabetes (HCC)    Hyperlipidemia associated with type 2 diabetes mellitus (HCC)      Reviewed:  Past Medical History:    Back problem    Diabetes (HCC)    High blood pressure    High cholesterol    Visual impairment    Reading glasses      Reviewed:  Family History   Problem Relation Age of Onset    No Known Problems Mother     No Known Problems Father     Other (snake bite) Paternal Grandfather     No Known Problems Paternal Uncle        Reviewed:  History reviewed. No pertinent surgical history.   Reviewed:  Social History     Socioeconomic History    Marital status:    Tobacco Use    Smoking status: Never     Passive exposure: Never    Smokeless tobacco: Never   Vaping Use    Vaping status: Never Used   Substance and Sexual Activity    Alcohol use: Not Currently    Drug use: Never    Sexual activity: Yes     Partners: Female      Reviewed:  Current Outpatient Medications   Medication Sig Dispense Refill    Lancets (ONETOUCH DELICA PLUS [id number]) Does not apply Misc 1 strip by In Vitro route 3 (three) times daily.      ONETOUCH ULTRA In Vitro Strip 3 (three) times daily.      atorvastatin 10 MG Oral Tab Take 1 tablet (10 mg total) by mouth nightly.      Blood Glucose Monitoring Suppl (ONETOUCH ULTRA 2) w/Device Does not apply Kit       metFORMIN HCl 1000 MG Oral Tab Take 1 tablet (1,000 mg total) by mouth 2 (two) times daily with meals.       valsartan 40 MG Oral Tab Take 1 tablet (40 mg total) by mouth daily.      gabapentin 300 MG Oral Cap Take 1 capsule (300 mg total) by mouth nightly. (Patient not taking: Reported on 6/21/2024) 30 capsule 0          Assessment & Plan    1. Encounter to establish care with new doctor  - POC Glycohemoglobin [03882]  See management A&P as below    2. Annual physical exam  - POC Glycohemoglobin [24241]  - GASTRO - INTERNAL  - PSA, Total W Reflex To Free; Future  - Vitamin D; Future  - TSH W Reflex To Free T4; Future  - Lipid Panel; Future  - Comp Metabolic Panel (14); Future  - CT CALCIUM SCORING; Future  - Microalb/Creat Ratio, Random Urine; Future  Patient here for physical exam and due for following.  Plan:  -order the following Labs: CBC, CMP, Lipid Panel, A1C, TSH, Vitamin D, PSA  -Obtain Baseline EKG  -Discussed benefit for Screening for Cardiac CT scan for evaluation of cardiac arthrosclerosis, ordered Calcium CT scan       3. Type 2 diabetes mellitus without complication, without long-term current use of insulin (HCC)  - Lancets (ONETOUCH DELICA PLUS [id number]) Does not apply Misc; 1 strip by In Vitro route 3 (three) times daily.  - ONETOUCH ULTRA In Vitro Strip; 3 (three) times daily.  - Lipid Panel; Future  - Comp Metabolic Panel (14); Future  - Microalb/Creat Ratio, Random Urine; Future  - DIABETIC EDUCATION - INTERNAL  Last A1c value was 7.5% done 6/21/2024. Pt currently on Metformin 1000mg po BID. Limited mobility due to pain   Plan:  -Ordered Retinal Eye exam screening  -Refer to Podiatry  -Continue Metformin 1,000mg po BID  -Check lipid panel, CMP, microalbumin  -refer to Diabetic education  -discussed possible second agent GLP-1 like Rybelsus or Jardiance . Pt would like to discuss ongoing lifestyle changes since he will have surgery soon and like to work on diet first  I recommend to eat a more balanced mediterranean diet (eat more vegetables and fruits) more omega 3 fatty acids, lean protein (chicken,  turkey, seafood), less process/fried fatty foods, less red met, and mixed aerobic exercise 180 minutes a week and intermittent strength training. And follow up in 3 months to recheck A1C and discuss if needs jardiance or rybelsus at that time.      4. Hyperlipidemia associated with type 2 diabetes mellitus (HCC)  - DIABETIC EDUCATION - INTERNAL  Pt with HLD on atorvastatin 10mg po daily   Plan;  -check lipid panel  -continue atorvastatin but adjust dose based on ascvd risk    5. Hypertension associated with diabetes (HCC)  - DIABETIC EDUCATION - INTERNAL  Plan  Chronic, well controlled on current medications, denies adverse effects, continue with present management.    6. Spinal stenosis of lumbar region with radiculopathy  Has upcoming surgery in July, will have labs/EKG next week and review Pre operative consultation for Dr. Jones    7. Encounter for screening colonoscopy  - GASTRO - INTERNAL  Pt never had screening colonoscopy    Patient overdue for Screening for Colorectal Malignancy: Offered referral for  colonoscopy patient in agreement for guideline directed colonoscopy    8. Vitamin D deficiency  - Vitamin D; Future  Check Vitamin D level if low start supplementation    9. Vitamin B12 deficiency  - Vitamin B12 [E]; Future  Pt on chronic metformin which can be linked to vitamin B12 deficiency  Plan;  -Check Vitamin B12 level and if low start supplementation.    10. Class 1 obesity due to excess calories without serious comorbidity with body mass index (BMI) of 31.0 to 31.9 in adult  I recommend to eat a more balanced mediterranean diet (eat more vegetables and fruits) more omega 3 fatty acids, lean protein (chicken, turkey, seafood), less process/fried fatty foods, less red met, and mixed aerobic exercise 180 minutes a week and intermittent strength training. And follow up in 3 months     11. Type 2 diabetes mellitus with other specified complication, unspecified whether long term insulin use (HCC)  - PODIATRY  - INTERNAL  - Diabetic Retinopathy Exam  OU - Both Eyes; Future          Follow Up:   No follow-ups on file.      Mac Norton MD  Internal Medicine       Patient asked to sign release of information for outside records if not already requested, make future office/imaging appointments at the  prior to leaving, and to sign up for [employer] if not already active.  Preventive measures and further education discussed with patient as per after visit summary. Potential medication side effects discussed. All questions answered to best of ability.   Call office with any questions. Seek emergency care if necessary.   Patient understands and agrees to follow directions and advice.      ----------------------------------------- PATIENT INSTRUCTIONS-----------------------------------------     Patient Instructions   Video Rotation Medical  What is Type 2 Diabetes?  Watch this clip to understand what happens within your body when you have type 2 diabetes, and the importance of keeping your blood glucose levels within a healthy range.  To watch the video:  Scan the QR code  Using your mobile device, scan the following code:  OR  Go to the website:  netTALK  Enter the prescription code:  [id number]    © 1813-0419 [employer]. All rights reserved. This information is not intended as a substitute for professional medical care. Always follow your healthcare professional's instructions.    Managing Type 2 Diabetes  Type 2 diabetes is a long-term (chronic) condition. Managing diabetes may mean making some tough changes. Yourhealthcare team can help you.  To manage type 2 diabetes, you'll need to balance your medicine with diet and activity. You will also need check your blood sugar. And work with yourhealthcare provider to prevent complications.    Take your medicine  You may take pills or give yourself insulin shots for diabetes. Or you may use both. Take your medicines or give yourself insulin at  the right times to help you control your blood sugar. Think about ways that will help you remember to take your medicines the right way every day. Ask your healthcare provider orteam for ideas.  You may only take pills for your diabetes. But this may change. Over time, mostpeople with type 2 diabetes also need insulin.  Eat healthy  A healthy diet helps control the amount of sugar in your blood. It also helps you stay at a healthy weight. Or it helps you lose weight, if if you'reoverweight. Extra weight makes it harder to control diabetes.  Your healthcare team will help you create a plan that works for you. You don'thave to give up all the foods you like. Have meals and snacks with:  Vegetables  Fruits  Lean meats or other healthy proteins  Whole grains  Low- or nonfat dairy products     Be physically active  Being active helps lower your blood sugar. Activity helps your body use insulinto turn food into energy. It also helps you manage your weight:  Ask your healthcare provider to help you to make an activity program that's right for you. Your program is based on your age, general health, and types of activity you enjoy. Start slow. But aim for at least 150 minutes of exercise or activity each week. Start with 30 minutes a day. Exercise in 10-minute blocks. Don’t let more than 2 days go by without being active.  Check your blood sugar  Checking your own blood sugar may be a regular part of your care. Or you may only need to check your blood sugar from time to time. Your healthcare provider will tell you how to check your blood sugar at home. Checking it tells you if your blood sugar is in your target range. Having your blood sugars within thetarget range means that you are managing your diabetes well.  If your blood sugar levels are too high or too low, your healthcare provider may suggest changes to your diet or activity level. He or she may also adjustyour medicine.  Your healthcare provider may also tell you to  check your blood sugar more oftenwhen you are sick.  Take care of yourself  When you have diabetes, you may be more likely to get other health problems. They include foot, eye, heart, nerve, and kidney problems. You can help prevent these problems by controlling your blood sugar and taking good care of yourself. Your healthcare provider, nurse, diabetes educator, and others canhelp you with the following:  Checkups. You should have regular checkups with your healthcare provider. At those visits, you will have a physical exam that includes checking your feet. Your healthcare provider will also check your blood pressure and weight. Take your shoes off before your appointment starts to be sure your feet are checked.  Other exams. You'll also need eye, foot, and dental exams at least once each year.  Lab tests. You will have blood and urine tests:  Your healthcare provider will check your hemoglobin A1C at least twice a year. This blood test shows how well you have been controlling your blood sugar over 2 to 3 months. The results help your healthcare provider manage your diabetes.    You will also have other lab tests. For example, to check for kidney problems and abnormal cholesterol levels.  Smoking. If you smoke, you will need to quit. Smoking makes it more likely to get complications from diabetes. Ask your healthcare provider about ways to quit. Also don't use e-cigarette, or vaping, products.  Vaccines. Get a yearly flu shot. And ask your healthcare provider about vaccines to prevent pneumonia, shingles, and hepatitis B.  Stress and depression  Most people have challenges throughout their lives. Living with diabetes can increase your stress. Feeling stressed or depressed can actually affect yourblood sugar levels.  Tell your healthcare provider if you are having trouble coping with diabetes.He or she can help or refer you to other providers or programs.  To learn more  Know where you can get help. You can try the  following:  Support. Ask family and friends to support your efforts to take care of yourself. Or look for a diabetes support group nearby or on the internet. Check the Connect with Others link on www.diabetes.org.  Counseling. Talk with a , psychologist, psychiatrist, or other counselor.  Information. Contact the American Diabetes Association at 876-114-7898 or www.diabetes.org  [employer] last reviewed this educational content on11/1/2019 © 2000-2022 [employer]. All rights reserved. This information is not intended as a substitute for professional medical care. Always follow yourhealthcare professional's instructions.    Type 2 Diabetes and Food Choices  You make food choices every day. Whole wheat or white bread? A side of French fries or fresh fruit? Eat now or later? Choices about what, when, and how much you eat affect your blood sugar (glucose), and also your blood pressure and cholesterol. Understanding how food affects blood glucose is the first step in managing diabetes. And following a diabetesmeal plan can help you keep your blood glucose levels on track.   Prevent problems  Having type 2 diabetes means that your body doesn’t control blood glucose well. When blood glucose stays too high for too long, serious health problems can develop. It's important to control your blood glucose through diet, exercise, and medicine. This can delay or prevent kidney,eye, nerve, and heart disease, and other complications of diabetes.   Control carbohydrates  Carbohydrates are foods that have the biggest effect on your blood glucose levels. After you eat carbohydrates, your blood glucose rises. Fruit, sweet foods and drinks, starchy foods (such as bread, potatoes, and rice), and milk and milk products contain carbohydrates. Carbohydrates are important for health. But when you eat too many at once, your blood glucose can go too high. This is even more likely if you don't have or take enough  insulin forthat food.   Some carbohydrates may raise blood glucose more than others. These include potatoes, sweets, and white bread. Better choices are less processed foods with more fiber and nutrients. Good options are 100% whole-wheat bread, oatmeal,brown rice, and nonstarchy vegetables.   Learn to use food labels that show added sugar. And try to find healthierchoices, particularly if you are overweight.    Food and medicine  Insulin helps glucose move from the blood into your muscle cells, where it can be used for energy. Some diabetes medicines that are taken by mouth help you make more insulin. Or they help your insulin work more efficiently. So your medicines and food plan have to work together. If you take insulin shots, you need to be very careful to match the amount of carbohydrates you eat with your insulin dose. If you have too many carbohydrates without adjusting your insulin dose, your blood glucose might become too high. If you have too few carbohydrates, your blood glucose might be too low. Your healthcare provider ora dietitian can help you match your food choices to your medicine.   Have a meal plan  With certain medicines, it's best to eat the same amount of food at the same time every day. That keeps your glucose levels stable. And it helps your medicine work best. Physical activity is an important way to control blood glucose, too. Try to exercise at the same time every day. That way you can build the extra calories you need for exercise into your meal plan. With othermedicines, you may have more choices about how much you eat and when.   If you want to change your medicine to better fit your lifestyle, talk withyour healthcare provider.   Eat smart  You can eat the same foods as everyone else, but you have to carefully watch for certain details. That’s where your diabetes meal plan comes in. A personal meal plan tells you the time of day to eat meals and snacks, the types of food to eat, and  how much. Itshould include your favorite foods. And it should focus on these healthy foods:   Whole grains, such as 100% whole-wheat bread, brown rice, and oatmeal  Nonfat or low-fat dairy products, such as nonfat milk and yogurt (but be sure these products don't have sugar added to make up for the fat removed)  Lean meats, poultry, fish, eggs, and dried beans and peas  Foods and drinks with no added sugar  Fruits and vegetables  At first, it may be helpful to use measuring cups and spoons to make sure you’re really eating the amount of food that’s in your plan. You can also use standardized portion sizes on food labels, such as 1 serving of meat being the size of a deck of cards. By checking your blood glucose 1 to 2 hours after eating, you can learn how your food choicesaffect your blood glucose.   To create a diabetes meal plan or change a plan that’s not working for you, see a dietitian or diabetes educator. Let them know if you have any new health concerns or if your medicines have changed. Having ameal plan that you can live with will keep you at your healthy best.     © 9911-1840 [employer]. All rights reserved. This information is not intended as a substitute for professional medical care. Always follow yourhealthcare professional's instructions.    Diabetes: Caring for Your Body   When you have diabetes, your body needs special care. This care helps you stay healthy and prevent problems. Exercise and healthy eating are a part of this. You can also protect yourself by taking special care of your feet, skin, teeth,and eyes.   Caring for your feet     Follow these tips to help keep your feet healthy:  Check your feet every day for redness, blisters, cracks, dry skin, or numbness. Use a mirror to check the bottoms of your feet, if needed. Or ask for help.  Wash your feet in warm (not hot) water. Don’t soak them.  Use an emery board to keep your toenails even with the ends of your toes. File away  sharp edges. A foot doctor (podiatrist) may need to cut your toenails for you.  Smooth down calluses gently or wait until your next podiatry appointment.  Keep your skin soft and smooth by putting a thin layer of skin lotion on the tops and bottoms of your feet. Don't put lotion in between your toes.  Always wear shoes or slippers, even inside your home. Make sure that shoes are correctly fitted, not too tight and not too loose. This can cause friction and rubbing of your feet. Change your socks daily. Always check shoes for foreign objects before putting them on.  Call your healthcare provider right away if your feet are numb or painful. Also call your provider if a cut or sore doesn’t heal in a few days.  Preventing skin infections   To prevent skin infections, bathe every day, but use a moderate water temperature.. Dry yourself well, especially between your toes. Try to keep your home on the humid side during the colder months of the year to prevent your skin getting dry. Wash any cuts with warm, soapy water. Cover with a sterile bandage. Call your healthcare provider if a cut or sore doesn't heal in a fewdays, feels warm, itches, is swollen, or has a bad smell.   Caring for your teeth   Follow these guidelines for healthy teeth:  Brush your teeth twice daily.  Floss your teeth daily.  See your dentist at least twice yearly.  Keep your blood sugar in a good range.  Caring for your eyes  Have your eyes checked every year by an optometrist or ophthalmologist. Letyour healthcare provider know if you experience any of the following symptoms:   Blurred vision  Dark spots or \"holes\"  Flashes of light  Seeing more floaters than usual  Poor night vision  If you smoke, quit  Smoking is dangerous for everyone, especially people with diabetes. It can harm the blood vessels in your eyes, kidneys, nerves, and heart. It raises blood pressure. Smoking can also slow healing, so infections are more likely. [facility]  provider about programs to help you stop smoking.   [employer] last reviewed this educational content on12/1/2021    © 3102-0329 [employer]. All rights reserved. This information is not intended as a substitute for professional medical care. Always follow yourhealthcare professional's instructions.                   The 21st Century Cures Act makes medical notes available to patients in the interest of transparency.  However, please be advised that this is a medical document.  It is intended as a peer to peer communication.  It is written in medical language and may contain abbreviations or verbiage that are technical and unfamiliar.  It may appear blunt or direct.  Medical documents are intended to carry relevant information, facts as evident, and the clinical opinion of the practitioner.

## 2024-06-21 NOTE — PROGRESS NOTES
RN Spine Navigator Education for Aric     If you have received instructions from your surgeon that are different from those listed below, please follow your physician's instructions.    You are scheduled for a Lumbar decompression with Dr. Jones on 7/3.      Patient Surgical Goals: Decrease back and leg pain.     Before Your Surgery    Choose a Care Partner  Patient attended spine navigator visit independently.  Care partner is wife and son. Your care partner(s) should be able to provide transportation to and from the hospital. They should be able to help at home for the first week after discharge, including helping you with meals, medication, and dressing changes. It is strongly recommended that someone stays with you for 24 hours after anesthesia if going home the day of surgery.    Clearance Before Surgery  You will need to see your primary care provider within 30 days before surgery. Please make sure this appointment is at least a week before surgery as more testing or doctor visits may be ordered. Presurgical testing may include labs, nasal swab, imaging, EKG.   Make sure that you complete all preadmission testing so that surgery does not get delayed.    Home Environment  Assessed home status: home has stairs and bathroom and bedroom are upstairs.   It is recommended that you prepare your home by putting clean sheets on your bed, freezing meals, and putting frequently used items at waist level.   Prevent falls by removing items that could cause you to trip, adding nightlights and adding a nonskid mat in shower.   Assistive devices can be purchased at a medical supply store or online including canes, walkers, toileting devices (commodes, raised toilet seats, toilet paper wiping aid), long handled sponge, shower chair or tub transfer bench, grabber/reacher tool, sock aid, long handled shoehorn, if needed.      Tobacco, Nicotine and Marijuana Use   Not applicable    Medications to Stop   For 7-10 days before  surgery do not take any blood thinning medications. This includes non-steroidal anti-inflammatories or NSAIDs (Advil, Aleve, Motrin, ibuprofen, naproxen, meloxicam, diclofenac, celecoxib, etc.), aspirin (unless told otherwise by your cardiologist or surgeon), herbal supplements and vitamins (garlic, turmeric, vitamin E, fish oil or krill oil, etc.). You may only take Tylenol or prescribed narcotic medication if needed for pain.   Other medications to stop include: hold oral DM medications day of surgery but check with providers about insulin and hold ACE/ARBS day of surgery    Leading Up to Day of Surgery  Five days before surgery wash with Hibiclens soap after your regular body soap every day. Do not put use Hibiclens on your face, hair or privates. Your last shower should be the night before surgery.  One-two business days before surgery, the preadmission testing staff will call you and let you know what time to arrive, where to park, when to take your Tylenol and Gatorade, and will provide any additional instructions.   After 11pm the day before surgery, do not eat or drink anything (including water, gum, or candies) except for Tylenol and Gatorade.   Drink 12 ounces of regular Gatorade (NOT RED) 12 hours prior to your scheduled surgery time. Drink your second 12 ounces of regular Gatorade and take 1000 mg of Tylenol (acetaminophen) 4 hours before your scheduled surgery time.     Items to Bring to the Hospital   Bring insurance card, ID, advanced directive, or medical power of  paperwork, loose fitting clothing, shoes with a back that can accommodate swollen feet, long phone charging cord, glasses.  Do not bring jewelry, valuables, or medications.     In the Hospital     Operative Day and Hospital Stay  In the preoperative area, you will change into a gown, have an IV placed in your hand or arm by the nurse, and sign any consent paperwork that is needed for your procedure. You will speak to the surgeon  and anesthesiologist. It is important to tell the doctors and nurses if you have had any significant side effects from pain medications or anesthesia such as a rash or severe nausea.    In the operating room, the anesthesiologist will attach monitors, give you oxygen through a mask, and give you medicine through the IV to fall asleep. After you are sleeping, the breathing tube will be placed. You will wake up on the stretcher.     During the surgery, your care partner can sit in the surgical waiting area. There are TV screens in that area that keep them informed of your progress. If the procedure is at [facility], there is a person who can speak to them about your surgical progress.     In the recovery room, monitors will be attached that check your heart rate, blood pressure and oxygen levels. While you may not remember this part, a nurse is with you and constantly checking on your condition. Medications for pain and nausea will be given if you need them. Your family is not allowed in the recovery room. When you are ready to leave the recovery room, you will go to the same day surgery discharge area. Your family may join you there as you continue to wake up. You will be offered something to eat and drink and be given pain pills if needed. You will get your discharge instructions from the nurse and go home from there.  Preventing surgical complications  It is important to follow all instructions before and after surgery to decrease the risks of surgery and prevent complications.     Blood clots: walk, and do ankle pumps at home  Infection: wash with Hibiclens before surgery, wash your hands, don't touch your incision  Pneumonia: take deep breaths and cough  Nausea/vomiting: start with liquids and small meals and do not take pills on an empty stomach  Constipation: drink water and walk frequently    Tell your nurse if you are experiencing nausea or vomiting as they have medications to help treat these.      At home      Understanding Pain After Surgery  We will do our best to manage your pain after surgery, but it is not possible to be completely pain-free. There will be operative pain in your back or neck. Pain in the arms or legs may be one of the first things to improve. Numbness, weakness, and tingling should improve over time. However, there can be a temporary increase in symptoms in the first few days due to inflammation from surgery.   Pain medications will be prescribed to take home at discharge. The goal of pain medicine after surgery is to make your pain tolerable, not to make you pain free. We encourage you to use the medication prescribed to you after your surgery, but please take the lowest possible dose to manage your pain. Taking high doses of narcotics can cause side effects. Transition to plain Tylenol when your pain improves. If you are at [facility], your prescriptions can be filled by our pharmacy and brought to you bedside. You may get more continuous pain relief by alternating between medications if you have multiple instead of taking them at the same time. Write down when you have taken a medication as it may be difficult to remember after a few doses.    Post operative medication   Tylenol (acetaminophen): take for pain. Do not take more than 3000 mg - 4000 mg in 24 hours because it can damage your liver.   Narcotics: take for moderate to severe pain. Do not drink alcohol or drive while taking narcotics. Some narcotic medications (Norco, Tylenol #3, Percocet, Vicodin) contain Tylenol (acetaminophen). Make sure to not exceed the maximum dose if you are taking additional Tylenol with these medications.  Muscle relaxers: take for muscle cramping. These can cause drowsiness.    NSAIDS (Advil, Aleve, Motrin, ibuprofen, naproxen, meloxicam, diclofenac, celecoxib, etc.) or aspirin: Do not take these unless your physician says it is ok.   Stool softeners: take to prevent constipation while you are taking  narcotic medications. You can get these over the counter at the pharmacy. You may use laxatives, which are stronger, if needed.    If you believe you will need more of medication your surgeon has prescribed to you, request a refill through your pharmacy or through the refill request in [employer] (log in, go to medications, then select refill request) at least two business days before you run out of medication.     Nonpharmacologic pain management   You may use ice on your incision for 20 minutes every hour to help with pain and swelling. Do not place ice directly on your skin. You can use heat to sooth your muscles but avoid placing heat directly on your incision. Make frequent position changes. You can do gentle stretching while avoiding significant bending or twisting. Use deep breathing techniques and distractions such as TV, music, reading, or games.     Movement restrictions  After surgery, no bending or twisting your neck or back. Do not lift anything over 10lbs (about the weight of a gallon of milk) or lift anything over your shoulders. Avoid pulling or pushing. You may use stairs while holding the handrail.  It is ok to ride in the car but refrain from driving or traveling long distances until cleared to do so by your surgeon. You may not drive while taking narcotics or muscle relaxants.    Post Op Exercise   Walk frequently. Start with walking short distances and increase as you start to feel better. Do ankle pumps (bending at your ankles, bring your feet towards your head then point them towards the ground) 15-20 times every hour when awake to help prevent blood clots.     Your surgeon will let you know at your post operative appointment if you are ready to decrease your movement restrictions and increase your exercise. If you have questions in between appointments about lessening your restrictions, please contact the office.     You and your doctor will discuss how you are feeling as you heal and decide if  outpatient physical therapy or a medical fitness referral is needed.    Caring for your incision  Always wash your hands before touching your incision. Your incision will be closed with sutures under the skin and skin glue or Steri-Strips (thin white bandages) on top of the skin. Do not attempt to peal off Skin glue/Steri-Strips as they will come off on their own. If the incision is closed with sutures or staples on top of the skin, they will be removed at a post op appointment. The incision may be covered with a gauze dressing that can come off after three days. Once the original gauze dressing is removed, we prefer that you leave your incision open to air (without a gauze dressing). If the incision has drainage or is rubbing against your clothes or brace, you may place gauze and medical tape over it. Change the gauze and medical tape daily. Look at your incision daily to check for signs of infection.   You can shower three days after your surgery or sooner if your surgeon allows. We recommend the care partner be present during the first shower for safety. Let soapy water run over the incision, but do not scrub it or spray it directly. Gently pat it dry after with a clean towel. Do not apply any creams or lotions to the incision. Do not soak in a tub, pool, or any body of water until your incision is fully healed.    Signs of Infection   Check your incision daily for swelling, redness, drainage, pus, bad smell, or opening of the incision.     When to Call for Assistance  Call the Ortho Spine Office ([phone] Option #3) if you experience signs of infection, opening of the incision, continuous nausea or vomiting, poor pain control despite using the pain medication as directed, a sudden increase in pain, temperature over 101F, difficulty swallowing, leg swelling, or with any concerns, unanswered questions, or new problems, such as new numbness/weakness/tingling.  Call 911 or go to the nearest emergency room if you  experience chest pain, difficulty breathing, loss of bowel or bladder control, extreme drowsiness, or any other life-threatening situation.     Follow-up Plan   Appointments with Dr. Jones or Stacey at 2, 6 and 12 weeks    Answered questions regarding: None     You can contact the RN Spine Navigator at [phone] or [email or web address] with additional questions or feedback on your care. It may take several business days to receive a reply so please do not call the RN spine navigator for refills or for emergencies.    Spine navigator spent 19 minutes conducting a virtual visit to provide education. Thank you for letting the RN Spine Navigator participate in your care.    Jazmyne ORLANDO RN

## 2024-06-24 ENCOUNTER — LABORATORY ENCOUNTER (OUTPATIENT)
Dept: LAB | Age: 49
End: 2024-06-24
Attending: STUDENT IN AN ORGANIZED HEALTH CARE EDUCATION/TRAINING PROGRAM

## 2024-06-24 ENCOUNTER — OFFICE VISIT (OUTPATIENT)
Dept: INTERNAL MEDICINE CLINIC | Facility: CLINIC | Age: 49
End: 2024-06-24

## 2024-06-24 VITALS
HEART RATE: 78 BPM | WEIGHT: 228 LBS | SYSTOLIC BLOOD PRESSURE: 121 MMHG | OXYGEN SATURATION: 99 % | DIASTOLIC BLOOD PRESSURE: 75 MMHG | BODY MASS INDEX: 30.88 KG/M2 | HEIGHT: 72 IN | TEMPERATURE: 98 F

## 2024-06-24 DIAGNOSIS — E11.69 HYPERLIPIDEMIA ASSOCIATED WITH TYPE 2 DIABETES MELLITUS (HCC): ICD-10-CM

## 2024-06-24 DIAGNOSIS — I15.2 HYPERTENSION ASSOCIATED WITH DIABETES (HCC): ICD-10-CM

## 2024-06-24 DIAGNOSIS — Z01.818 PRE-OPERATIVE EXAMINATION: Primary | ICD-10-CM

## 2024-06-24 DIAGNOSIS — M51.16 LUMBAR DISC HERNIATION WITH RADICULOPATHY: ICD-10-CM

## 2024-06-24 DIAGNOSIS — Z12.5 SCREENING FOR PROSTATE CANCER: ICD-10-CM

## 2024-06-24 DIAGNOSIS — D17.79 LIPOMA OF OTHER SPECIFIED SITES: ICD-10-CM

## 2024-06-24 DIAGNOSIS — E11.9 TYPE 2 DIABETES MELLITUS WITHOUT COMPLICATION, WITHOUT LONG-TERM CURRENT USE OF INSULIN (HCC): ICD-10-CM

## 2024-06-24 DIAGNOSIS — D17.21 LIPOMA OF RIGHT UPPER EXTREMITY: ICD-10-CM

## 2024-06-24 DIAGNOSIS — Z01.818 PRE-OP TESTING: ICD-10-CM

## 2024-06-24 DIAGNOSIS — E11.59 HYPERTENSION ASSOCIATED WITH DIABETES (HCC): ICD-10-CM

## 2024-06-24 DIAGNOSIS — E78.5 HYPERLIPIDEMIA ASSOCIATED WITH TYPE 2 DIABETES MELLITUS (HCC): ICD-10-CM

## 2024-06-24 DIAGNOSIS — Z00.00 ANNUAL PHYSICAL EXAM: Primary | ICD-10-CM

## 2024-06-24 DIAGNOSIS — E55.9 VITAMIN D DEFICIENCY: ICD-10-CM

## 2024-06-24 PROBLEM — D17.9 LIPOMA: Status: ACTIVE | Noted: 2024-06-24

## 2024-06-24 LAB — COMPLEXED PSA SERPL-MCNC: 0.55 NG/ML (ref ?–4)

## 2024-06-24 PROCEDURE — 36415 COLL VENOUS BLD VENIPUNCTURE: CPT

## 2024-06-24 PROCEDURE — 87081 CULTURE SCREEN ONLY: CPT

## 2024-06-24 RX ORDER — ATORVASTATIN CALCIUM 20 MG/1
20 TABLET, FILM COATED ORAL NIGHTLY
Qty: 90 TABLET | Refills: 3 | Status: SHIPPED | OUTPATIENT
Start: 2024-06-24 | End: 2025-06-19

## 2024-06-24 RX ORDER — MULTIVIT-MIN/IRON/FOLIC ACID/K 18-600-40
1 CAPSULE ORAL DAILY
Qty: 90 CAPSULE | Refills: 3 | Status: SHIPPED | OUTPATIENT
Start: 2024-06-24 | End: 2025-06-19

## 2024-06-24 RX ORDER — VALSARTAN 40 MG/1
40 TABLET ORAL DAILY
Qty: 90 TABLET | Refills: 1 | Status: SHIPPED | OUTPATIENT
Start: 2024-06-24 | End: 2024-12-21

## 2024-06-24 NOTE — PROGRESS NOTES
OFFICE NOTE     Patient ID: Aric Jackson is a 49 year old male.  Today's Date: 06/24/24  Chief Complaint: Pre-Op Exam (Sx date: 7/3/24 L4-L5 laminotomy, discectomy Dr. Jones)                  Aric Jackson is a pleasant 49 year old male with PMHx of Type 2 DM, HLD, HTN Lumbar radiculopathy who presents for Pre-operative evaluation       Aric Jackson presents for preoperative clearance for:   Performing Physician:Dr. Ricardo Jones  Date of surgery: 7/3/2024  Elective or emergency: Elective  Pre-operative forms provided: yes        Active medical problems:   Patient Active Problem List   Diagnosis    Spinal stenosis of lumbar region with radiculopathy    Class 1 obesity due to excess calories without serious comorbidity with body mass index (BMI) of 31.0 to 31.9 in adult    Vitamin B12 deficiency    Vitamin D deficiency    Type 2 diabetes mellitus without complication, without long-term current use of insulin (HCC)    Hypertension associated with diabetes (HCC)    Hyperlipidemia associated with type 2 diabetes mellitus (HCC)    Lipoma         Cervical/neck:   - Arthritis: No  - Neck pain: No  - Difficulty with ROM: No  - Prior neck injury/surgery: No    Cardiac:  - History of ischemic coronary disease: No  - History of heart failure: No  - Heart attack in past 90 days: No  - Chest pain in last 90 days: No  - History of Arrhythmia:  No  - History of stroke or TIA:   (in past 6-9months?) No  - Diabetes/A1C: Last A1c value was 7.5% done 6/21/2024.      - Anticoagulation/Warfarin/ASA/NOAC: No      Pulmonary:   - Smoker: No  - Apnea/CPAP: No  - Asthma/COPD:No  - Difficulty laying flat for extended period of time: No  - Dyspnea/exertional: No  - Respiratory Medications: No    Functional Capacity:   Perform ADLs- eating, dress, toilet (1 METs)? Yes, patient can perform.   Walk up flight of steps, hill, walk ground level 3-4mph (4 METs)? Yes, patient can perform.   Perform heavy housework-  scrubbing floors, move heavy furniture, climb 2 flights of stairs (4-10 METs)? Yes; comment: limited due to back pain  Participate in strenuous sports- swimming, singles tennis, football, basketball, ski (+10 METs)?  No        Vitals:    06/24/24 1359   BP: 121/75   Pulse: 78   Temp: 97.6 °F (36.4 °C)   TempSrc: Oral   SpO2: 99%   Weight: 228 lb (103.4 kg)   Height: 6' (1.829 m)     body mass index is 30.92 kg/m².  BP Readings from Last 3 Encounters:   06/24/24 121/75   06/21/24 119/82     The 10-year ASCVD risk score (Sheryl BADILLO, et al., 2019) is: 5.2%    Values used to calculate the score:      Age: 49 years      Sex: Male      Is Non- : No      Diabetic: Yes      Tobacco smoker: No      Systolic Blood Pressure: 121 mmHg      Is BP treated: Yes      HDL Cholesterol: 45 mg/dL      Total Cholesterol: 137 mg/dL      Medications reviewed:  Current Outpatient Medications   Medication Sig Dispense Refill    Cholecalciferol (VITAMIN D) 50 MCG (2000 UT) Oral Cap Take 1 capsule (2,000 Units total) by mouth daily. 90 capsule 3    atorvastatin 20 MG Oral Tab Take 1 tablet (20 mg total) by mouth nightly. 90 tablet 3    valsartan 40 MG Oral Tab Take 1 tablet (40 mg total) by mouth daily. 90 tablet 1    metFORMIN HCl 1000 MG Oral Tab Take 1 tablet (1,000 mg total) by mouth 2 (two) times daily with meals. 180 tablet 1    Lancets (ONETOUCH DELICA PLUS [id number]) Does not apply Misc 1 strip by In Vitro route 3 (three) times daily.      ONETOUCH ULTRA In Vitro Strip 3 (three) times daily.      Blood Glucose Monitoring Suppl (ONETOUCH ULTRA 2) w/Device Does not apply Kit            Assessment & Plan    1. Pre-operative examination (Primary)    #Revised Cardiac Risk Index   Revised Cardiac Risk Index for Pre-Operative Risk from [email or web address]  on 6/24/2024  ** All calculations should be rechecked by clinician prior to use **    RESULT SUMMARY:  0 points  Class I Risk    3.9 %  30-day risk of death, MI, or cardiac  arrest    From [facility] [place]. These numbers are higher than those from the original study (Ranjith 1999). See Evidence for details.      INPUTS:  Elevated-risk surgery --> 0 = No  History of ischemic heart disease --> 0 = No  History of congestive heart failure --> 0 = No  History of cerebrovascular disease --> 0 = No  Pre-operative treatment with insulin --> 0 = No  Pre-operative creatinine >2 mg/dL / 176.8 µmol/L --> 0 = No    Pt is Low Risk for Complications and cleared for surgery, Dr. Jones to proceed with surgical intervention if benefits outweigh risks at time of procedure. Patient instructed to follow all pre and post surgical advice. Surgeon and anesthesiology physicians to make final decision at time of procedure based upon patients clinical status.      2. Vitamin D deficiency  -     Vitamin D; Take 1 capsule (2,000 Units total) by mouth daily.  Dispense: 90 capsule; Refill: 3  Pt with vitamin D def  Plan;  -Start Vitamin D 2,000 Units daily    3. Type 2 diabetes mellitus without complication, without long-term current use of insulin (HCC)  -     Atorvastatin Calcium; Take 1 tablet (20 mg total) by mouth nightly.  Dispense: 90 tablet; Refill: 3  -     metFORMIN HCl; Take 1 tablet (1,000 mg total) by mouth 2 (two) times daily with meals.  Dispense: 180 tablet; Refill: 1  Pt with DM and HLD/HTN  Plan;  -Start metformin 1gm BID and atorvastatin 20mg daily    4. Hyperlipidemia associated with type 2 diabetes mellitus (HCC)  -     Atorvastatin Calcium; Take 1 tablet (20 mg total) by mouth nightly.  Dispense: 90 tablet; Refill: 3  Lipid stable,   Plan;  Continue moderate intensity statin (atorvastatin 20mg nightly)    5. Hypertension associated with diabetes (HCC)  -     Valsartan; Take 1 tablet (40 mg total) by mouth daily.  Dispense: 90 tablet; Refill: 1  Pt with HTN and DM   Plan  Chronic, well controlled on current medications, denies adverse effects, continue with present management.      6. Lipoma of right upper  extremity  Pt with Lipoma of Right arm and left lower lumbar  Monitor for now    7. Lipoma of other specified sites  Pt with Lipoma of Right arm and left lower lumbar  Monitor for now    8. Screening for prostate cancer  -     PSA Total, Screen; Future; Expected date: 2024  Check Screening PSA       Follow Up: As needed/if symptoms worsen or Return in about 3 months (around 2024), or if symptoms worsen or fail to improve, for Diabetes follow up 3..     I spent 44 minutes obtaining pertitent medical history, reviewing pertinent imaging/labs and specialists notes, evaluating patient, discussing differential diagnosis' and various treatment options, reinforcing importance of compliance with treatment plan, and completing documentation.     Encounter Times  PreChartin minutes    Reviewing/Obtaining: 10 minutes      Medical Exam: 4 minutes    Plan: 5 minutes      Notes: 10 minutes    Counseling/Education: 5 minutes      Referring/Communicating: 3 minutes    Ind Interpretation:   minutes      Care Coordination:   minutes       My total time spent caring for the patient on the day of the encounter: 44 minutes.          Objective/ Results:   Physical Exam  Constitutional:       Appearance: He is well-developed.   Cardiovascular:      Rate and Rhythm: Normal rate and regular rhythm.      Heart sounds: Normal heart sounds.   Pulmonary:      Effort: Pulmonary effort is normal.      Breath sounds: Normal breath sounds.   Abdominal:      General: Bowel sounds are normal.      Palpations: Abdomen is soft.   Skin:     General: Skin is warm and dry.   Neurological:      Mental Status: He is alert and oriented to person, place, and time.      Deep Tendon Reflexes: Reflexes are normal and symmetric.        Reviewed:    Patient Active Problem List    Diagnosis    Lipoma    Spinal stenosis of lumbar region with radiculopathy    Class 1 obesity due to excess calories without serious comorbidity with body mass index (BMI)  of 31.0 to 31.9 in adult    Vitamin B12 deficiency    Vitamin D deficiency    Type 2 diabetes mellitus without complication, without long-term current use of insulin (HCC)    Hypertension associated with diabetes (HCC)    Hyperlipidemia associated with type 2 diabetes mellitus (HCC)      No Known Allergies     Social History     Socioeconomic History    Marital status:    Tobacco Use    Smoking status: Never     Passive exposure: Never    Smokeless tobacco: Never   Vaping Use    Vaping status: Never Used   Substance and Sexual Activity    Alcohol use: Not Currently    Drug use: Never    Sexual activity: Yes     Partners: Female      Review of Systems   Constitutional: Negative.    Respiratory: Negative.     Cardiovascular: Negative.    Gastrointestinal: Negative.    Skin: Negative.    Neurological: Negative.      All other systems negative unless otherwise stated in ROS or HPI above.       Mac Norton MD  Internal Medicine       Call office with any questions or seek emergency care if necessary.   Patient understands and agrees to follow directions and advice.      ----------------------------------------- PATIENT INSTRUCTIONS-----------------------------------------     There are no Patient Instructions on file for this visit.      The 21st Century Cures Act makes medical notes available to patients in the interest of transparency.  However, please be advised that this is a medical document.  It is intended as a peer to peer communication.  It is written in medical language and may contain abbreviations or verbiage that are technical and unfamiliar.  It may appear blunt or direct.  Medical documents are intended to carry relevant information, facts as evident, and the clinical opinion of the practitioner.

## 2024-06-24 NOTE — PROGRESS NOTES
Will discuss with patient today pre-operative exam labs:  Hello, after review of your labs here are your recommendations:    # Lipids/cholesterol: Your lips are stable, continue with current medications.     The 10-year ASCVD risk score (Sheryl BADILLO, et al., 2019) is: 5%    Values used to calculate the score:      Age: 49 years      Sex: Male      Is Non- : No      Diabetic: Yes      Tobacco smoker: No      Systolic Blood Pressure: 119 mmHg      Is BP treated: Yes      HDL Cholesterol: 45 mg/dL      Total Cholesterol: 137 mg/dL      # CMP: Your comprehensive metabolic panel shows overall stable functioning kidneys (creatinine, GFR), liver (AST, ALT, Bilirubin), and electrolytes (sodium, potassium, calcium). Slight variations in other values such as BUN/Creat, Serum Osm, anion gap, chloride, etc are not of clinical value at this time.     # CBC: Your complete blood count shows overall stable red blood cells, white blood cells, platelets (help you stop bleeding), and hematocrit (thickness of blood),  Slight variations in other values such as RDW/sw, MCH are not of clinical value at this time.     # TSH: Your thyroid (TSH) function is normal. TSH 1.390    # Vitamins: Your vitamin D level is low. If not already taking, please start over-the-counter vitamin D3 5000 IU daily with meals to help replace and maintain your Vitamin D level. If you are taking 5,000 units let me know for further dose instructions. We will recheck your Vitamin D level yearly, sooner if clinically indicated such as osteoporosis.     # Diabetes/A1C: Your A1C Last A1c value was 7.5% done 6/21/2024. which shows  Not yet at Goal. Goal below 6.5 to 7 A1C. Continue lifestyle changes for now/next 3 months and follow up and might consider GLP-1 (Rybelsus)/ or SGLT-2 inhibitor like jardiance. diabetes    # Prostate: ordered and still waiting for results/labs     Vitamin B12 level 383 (low normal range), continue to monitor yearly  since on chronic metformin    #EKG done 6/21: Normal Sinus rhythm, Qtc 389  If you have any questions or concerns in regards to these labs please schedule a follow up and will gladly discuss.   -Dr. Norton

## 2024-06-25 NOTE — PROGRESS NOTES
Please relay to patient:  Rodrigolo Mr. Jackson,   Your Prostate Specific Antigen is 0.55 in normal limits. I have reviewed your EKG and other blood work as discussed. I have already messaged Dr. Jones and are cleared for surgery.   -Dr. Norton

## 2024-07-02 ENCOUNTER — ANESTHESIA EVENT (OUTPATIENT)
Dept: SURGERY | Facility: HOSPITAL | Age: 49
End: 2024-07-02
Payer: MEDICAID

## 2024-07-02 NOTE — ANESTHESIA PREPROCEDURE EVALUATION
PRE-OP EVALUATION    Patient Name: Aric Jackson    Admit Diagnosis: Lumbar disc herniation with radiculopathy [M51.16]    Pre-op Diagnosis: Lumbar disc herniation with radiculopathy [M51.16]    Left Lumbar 4-5 laminotomy, discectomy    Anesthesia Procedure: Left Lumbar 4-5 laminotomy, discectomy (Left: Spine Lumbar)  . (Spine Lumbar)    Surgeons and Role:     * Ricardo Jones MD - Primary    Pre-op vitals reviewed.  Temp: 98.1 °F (36.7 °C)  Pulse: 73  Resp: 18  BP: 109/58  SpO2: 98 %  Body mass index is 30.8 kg/m².    Current medications reviewed.  Hospital Medications:   acetaminophen (Tylenol Extra Strength) tab 1,000 mg  1,000 mg Oral Once    scopolamine (Transderm-Scop) 1 MG/3DAYS patch 1 patch  1 patch Transdermal Once    glucose (Dex4) 15 GM/59ML oral liquid 15 g  15 g Oral Q15 Min PRN    Or    glucose (Glutose) 40% oral gel 15 g  15 g Oral Q15 Min PRN    Or    glucose-vitamin C (Dex-4) chewable tab 4 tablet  4 tablet Oral Q15 Min PRN    Or    dextrose 50% injection 50 mL  50 mL Intravenous Q15 Min PRN    Or    glucose (Dex4) 15 GM/59ML oral liquid 30 g  30 g Oral Q15 Min PRN    Or    glucose (Glutose) 40% oral gel 30 g  30 g Oral Q15 Min PRN    Or    glucose-vitamin C (Dex-4) chewable tab 8 tablet  8 tablet Oral Q15 Min PRN    lactated ringers infusion   Intravenous Continuous    ceFAZolin (Ancef) 2g in 10mL IV syringe premix  2 g Intravenous Once       Outpatient Medications:     Medications Prior to Admission   Medication Sig Dispense Refill Last Dose    atorvastatin 20 MG Oral Tab Take 1 tablet (20 mg total) by mouth nightly. 90 tablet 3 7/2/2024 at 2100    valsartan 40 MG Oral Tab Take 1 tablet (40 mg total) by mouth daily. 90 tablet 1 7/2/2024 at 2100    metFORMIN HCl 1000 MG Oral Tab Take 1 tablet (1,000 mg total) by mouth 2 (two) times daily with meals. 180 tablet 1 7/2/2024 at 1900    Blood Glucose Monitoring Suppl (ONETOUCH ULTRA 2) w/Device Does not apply Kit        Cholecalciferol (VITAMIN D)  50 MCG (2000 UT) Oral Cap Take 1 capsule (2,000 Units total) by mouth daily. 90 capsule 3 6/30/2024    Lancets (ONETOUCH DELICA PLUS [id number]) Does not apply Misc 1 strip by In Vitro route 3 (three) times daily.       ONETOUCH ULTRA In Vitro Strip 3 (three) times daily.          Allergies: Patient has no known allergies.      Anesthesia Evaluation    Patient summary reviewed.    Anesthetic Complications           GI/Hepatic/Renal    Negative GI/hepatic/renal ROS.                             Cardiovascular      ECG reviewed.            (+) hypertension   (+) hyperlipidemia                                  Endo/Other      (+) diabetes  type 1, not using insulin                         Pulmonary    Negative pulmonary ROS.                       Neuro/Psych  Comment: Lumbar disc herniation for discectomy               (+) neuromuscular disease                     History reviewed. No pertinent surgical history.  Social History     Socioeconomic History    Marital status:    Tobacco Use    Smoking status: Never     Passive exposure: Never    Smokeless tobacco: Never   Vaping Use    Vaping status: Never Used   Substance and Sexual Activity    Alcohol use: Not Currently    Drug use: Never    Sexual activity: Yes     Partners: Female     History   Drug Use Unknown     Available pre-op labs reviewed.  Lab Results   Component Value Date    WBC 8.3 06/21/2024    RBC 4.91 06/21/2024    HGB 15.0 06/21/2024    HCT 44.1 06/21/2024    MCV 89.8 06/21/2024    MCH 30.5 06/21/2024    MCHC 34.0 06/21/2024    RDW 13.3 06/21/2024    .0 06/21/2024     Lab Results   Component Value Date     06/21/2024    K 4.5 06/21/2024     06/21/2024    CO2 26.0 06/21/2024    BUN 13 06/21/2024    CREATSERUM 0.99 06/21/2024     (H) 06/21/2024    CA 10.1 06/21/2024     Lab Results   Component Value Date    INR 1.00 06/21/2024         Airway  Comment: Full beard    Mallampati: III  Mouth opening: 3 FB  TM distance: > 6  cm  Neck ROM: full Cardiovascular    Cardiovascular exam normal.  Rhythm: regular  Rate: normal  (-) murmur   Dental    Dentition appears grossly intact         Pulmonary    Pulmonary exam normal.  Breath sounds clear to auscultation bilaterally.               Other findings              ASA: 2   Plan: general  NPO status verified and patient meets guidelines.        Comment: GA with OETT/LMA explained to patient. Risks/benefits explained including but not limited to sore throat, hoarse voice, nausea, dental trauma, eye injury,pulmonary complication and other complications as discussed in anesthesia consent form. Precautions of prone position including eye care and nerve padding explained. Possible eye or nerve injury discussed. Also discussed recall under anesthesia with patient. Explained that this is a rare event and we will do our best to make sure that he is anesthetized throughout the surgery.Patient agrees to proceed. Anesthesia consent signed.     Plan/risks discussed with: patient                Present on Admission:  **None**

## 2024-07-03 ENCOUNTER — HOSPITAL ENCOUNTER (OUTPATIENT)
Facility: HOSPITAL | Age: 49
Setting detail: HOSPITAL OUTPATIENT SURGERY
Discharge: HOME | End: 2024-07-03
Attending: STUDENT IN AN ORGANIZED HEALTH CARE EDUCATION/TRAINING PROGRAM | Admitting: STUDENT IN AN ORGANIZED HEALTH CARE EDUCATION/TRAINING PROGRAM
Payer: MEDICAID

## 2024-07-03 ENCOUNTER — APPOINTMENT (OUTPATIENT)
Dept: GENERAL RADIOLOGY | Facility: HOSPITAL | Age: 49
End: 2024-07-03
Attending: STUDENT IN AN ORGANIZED HEALTH CARE EDUCATION/TRAINING PROGRAM
Payer: MEDICAID

## 2024-07-03 ENCOUNTER — ANESTHESIA (OUTPATIENT)
Dept: SURGERY | Facility: HOSPITAL | Age: 49
End: 2024-07-03
Payer: MEDICAID

## 2024-07-03 ENCOUNTER — PATIENT MESSAGE (OUTPATIENT)
Dept: ORTHOPEDICS CLINIC | Facility: CLINIC | Age: 49
End: 2024-07-03

## 2024-07-03 VITALS
HEART RATE: 98 BPM | OXYGEN SATURATION: 98 % | HEIGHT: 72 IN | TEMPERATURE: 97 F | SYSTOLIC BLOOD PRESSURE: 131 MMHG | WEIGHT: 227.06 LBS | BODY MASS INDEX: 30.75 KG/M2 | RESPIRATION RATE: 16 BRPM | DIASTOLIC BLOOD PRESSURE: 94 MMHG

## 2024-07-03 DIAGNOSIS — Z01.818 PRE-OP TESTING: ICD-10-CM

## 2024-07-03 DIAGNOSIS — M51.16 LUMBAR DISC HERNIATION WITH RADICULOPATHY: Primary | ICD-10-CM

## 2024-07-03 DIAGNOSIS — Z98.890 S/P LUMBAR MICRODISCECTOMY: ICD-10-CM

## 2024-07-03 LAB
GLUCOSE BLD-MCNC: 121 MG/DL (ref 70–99)
GLUCOSE BLD-MCNC: 123 MG/DL (ref 70–99)

## 2024-07-03 PROCEDURE — 82962 GLUCOSE BLOOD TEST: CPT

## 2024-07-03 PROCEDURE — 01NB0ZZ RELEASE LUMBAR NERVE, OPEN APPROACH: ICD-10-PCS | Performed by: STUDENT IN AN ORGANIZED HEALTH CARE EDUCATION/TRAINING PROGRAM

## 2024-07-03 PROCEDURE — 76000 FLUOROSCOPY <1 HR PHYS/QHP: CPT | Performed by: STUDENT IN AN ORGANIZED HEALTH CARE EDUCATION/TRAINING PROGRAM

## 2024-07-03 PROCEDURE — 0SB20ZZ EXCISION OF LUMBAR VERTEBRAL DISC, OPEN APPROACH: ICD-10-PCS | Performed by: STUDENT IN AN ORGANIZED HEALTH CARE EDUCATION/TRAINING PROGRAM

## 2024-07-03 RX ORDER — DEXAMETHASONE SODIUM PHOSPHATE 4 MG/ML
VIAL (ML) INJECTION AS NEEDED
Status: DISCONTINUED | OUTPATIENT
Start: 2024-07-03 | End: 2024-07-03 | Stop reason: SURG

## 2024-07-03 RX ORDER — SODIUM CHLORIDE, SODIUM LACTATE, POTASSIUM CHLORIDE, CALCIUM CHLORIDE 600; 310; 30; 20 MG/100ML; MG/100ML; MG/100ML; MG/100ML
INJECTION, SOLUTION INTRAVENOUS CONTINUOUS
Status: DISCONTINUED | OUTPATIENT
Start: 2024-07-03 | End: 2024-07-03

## 2024-07-03 RX ORDER — KETOROLAC TROMETHAMINE 30 MG/ML
INJECTION, SOLUTION INTRAMUSCULAR; INTRAVENOUS AS NEEDED
Status: DISCONTINUED | OUTPATIENT
Start: 2024-07-03 | End: 2024-07-03 | Stop reason: SURG

## 2024-07-03 RX ORDER — ACETAMINOPHEN 500 MG
1000 TABLET ORAL EVERY 8 HOURS PRN
Qty: 90 TABLET | Refills: 0 | Status: SHIPPED | OUTPATIENT
Start: 2024-07-03

## 2024-07-03 RX ORDER — DEXTROSE MONOHYDRATE 25 G/50ML
50 INJECTION, SOLUTION INTRAVENOUS
Status: DISCONTINUED | OUTPATIENT
Start: 2024-07-03 | End: 2024-07-03 | Stop reason: HOSPADM

## 2024-07-03 RX ORDER — CYCLOBENZAPRINE HCL 5 MG
5 TABLET ORAL 3 TIMES DAILY PRN
Qty: 30 TABLET | Refills: 0 | Status: SHIPPED | OUTPATIENT
Start: 2024-07-03

## 2024-07-03 RX ORDER — HYDROMORPHONE HYDROCHLORIDE 1 MG/ML
0.4 INJECTION, SOLUTION INTRAMUSCULAR; INTRAVENOUS; SUBCUTANEOUS EVERY 5 MIN PRN
Status: DISCONTINUED | OUTPATIENT
Start: 2024-07-03 | End: 2024-07-03

## 2024-07-03 RX ORDER — ONDANSETRON 4 MG/1
4 TABLET, FILM COATED ORAL EVERY 12 HOURS PRN
Qty: 10 TABLET | Refills: 0 | Status: SHIPPED | OUTPATIENT
Start: 2024-07-03

## 2024-07-03 RX ORDER — HYDROCODONE BITARTRATE AND ACETAMINOPHEN 5; 325 MG/1; MG/1
2 TABLET ORAL ONCE AS NEEDED
Status: COMPLETED | OUTPATIENT
Start: 2024-07-03 | End: 2024-07-03

## 2024-07-03 RX ORDER — NEOSTIGMINE METHYLSULFATE 1 MG/ML
INJECTION, SOLUTION INTRAVENOUS AS NEEDED
Status: DISCONTINUED | OUTPATIENT
Start: 2024-07-03 | End: 2024-07-03 | Stop reason: SURG

## 2024-07-03 RX ORDER — ONDANSETRON 2 MG/ML
4 INJECTION INTRAMUSCULAR; INTRAVENOUS EVERY 6 HOURS PRN
Status: DISCONTINUED | OUTPATIENT
Start: 2024-07-03 | End: 2024-07-03

## 2024-07-03 RX ORDER — METHOCARBAMOL 100 MG/ML
INJECTION, SOLUTION INTRAMUSCULAR; INTRAVENOUS AS NEEDED
Status: DISCONTINUED | OUTPATIENT
Start: 2024-07-03 | End: 2024-07-03 | Stop reason: SURG

## 2024-07-03 RX ORDER — BUPIVACAINE HYDROCHLORIDE AND EPINEPHRINE 2.5; 5 MG/ML; UG/ML
INJECTION, SOLUTION EPIDURAL; INFILTRATION; INTRACAUDAL; PERINEURAL AS NEEDED
Status: DISCONTINUED | OUTPATIENT
Start: 2024-07-03 | End: 2024-07-03 | Stop reason: HOSPADM

## 2024-07-03 RX ORDER — SCOLOPAMINE TRANSDERMAL SYSTEM 1 MG/1
1 PATCH, EXTENDED RELEASE TRANSDERMAL ONCE
Status: DISCONTINUED | OUTPATIENT
Start: 2024-07-03 | End: 2024-07-03 | Stop reason: HOSPADM

## 2024-07-03 RX ORDER — NICOTINE POLACRILEX 4 MG
30 LOZENGE BUCCAL
Status: DISCONTINUED | OUTPATIENT
Start: 2024-07-03 | End: 2024-07-03 | Stop reason: HOSPADM

## 2024-07-03 RX ORDER — DIAZEPAM 5 MG/1
5 TABLET ORAL NIGHTLY PRN
Qty: 20 TABLET | Refills: 0 | Status: SHIPPED | OUTPATIENT
Start: 2024-07-03

## 2024-07-03 RX ORDER — MEPERIDINE HYDROCHLORIDE 25 MG/ML
12.5 INJECTION INTRAMUSCULAR; INTRAVENOUS; SUBCUTANEOUS AS NEEDED
Status: DISCONTINUED | OUTPATIENT
Start: 2024-07-03 | End: 2024-07-03

## 2024-07-03 RX ORDER — ONDANSETRON 2 MG/ML
INJECTION INTRAMUSCULAR; INTRAVENOUS AS NEEDED
Status: DISCONTINUED | OUTPATIENT
Start: 2024-07-03 | End: 2024-07-03 | Stop reason: SURG

## 2024-07-03 RX ORDER — MIDAZOLAM HYDROCHLORIDE 1 MG/ML
1 INJECTION INTRAMUSCULAR; INTRAVENOUS EVERY 5 MIN PRN
Status: DISCONTINUED | OUTPATIENT
Start: 2024-07-03 | End: 2024-07-03

## 2024-07-03 RX ORDER — HYDROCODONE BITARTRATE AND ACETAMINOPHEN 5; 325 MG/1; MG/1
1 TABLET ORAL ONCE AS NEEDED
Status: COMPLETED | OUTPATIENT
Start: 2024-07-03 | End: 2024-07-03

## 2024-07-03 RX ORDER — OXYCODONE HYDROCHLORIDE 5 MG/1
5 TABLET ORAL
Qty: 20 TABLET | Refills: 0 | Status: SHIPPED | OUTPATIENT
Start: 2024-07-03

## 2024-07-03 RX ORDER — ROCURONIUM BROMIDE 10 MG/ML
INJECTION, SOLUTION INTRAVENOUS AS NEEDED
Status: DISCONTINUED | OUTPATIENT
Start: 2024-07-03 | End: 2024-07-03 | Stop reason: SURG

## 2024-07-03 RX ORDER — NALOXONE HYDROCHLORIDE 0.4 MG/ML
0.08 INJECTION, SOLUTION INTRAMUSCULAR; INTRAVENOUS; SUBCUTANEOUS AS NEEDED
Status: DISCONTINUED | OUTPATIENT
Start: 2024-07-03 | End: 2024-07-03

## 2024-07-03 RX ORDER — NICOTINE POLACRILEX 4 MG
15 LOZENGE BUCCAL
Status: DISCONTINUED | OUTPATIENT
Start: 2024-07-03 | End: 2024-07-03 | Stop reason: HOSPADM

## 2024-07-03 RX ORDER — HYDROMORPHONE HYDROCHLORIDE 1 MG/ML
0.2 INJECTION, SOLUTION INTRAMUSCULAR; INTRAVENOUS; SUBCUTANEOUS EVERY 5 MIN PRN
Status: DISCONTINUED | OUTPATIENT
Start: 2024-07-03 | End: 2024-07-03

## 2024-07-03 RX ORDER — PROCHLORPERAZINE EDISYLATE 5 MG/ML
5 INJECTION INTRAMUSCULAR; INTRAVENOUS EVERY 8 HOURS PRN
Status: DISCONTINUED | OUTPATIENT
Start: 2024-07-03 | End: 2024-07-03

## 2024-07-03 RX ORDER — ACETAMINOPHEN 500 MG
1000 TABLET ORAL ONCE AS NEEDED
Status: COMPLETED | OUTPATIENT
Start: 2024-07-03 | End: 2024-07-03

## 2024-07-03 RX ORDER — GLYCOPYRROLATE 0.2 MG/ML
INJECTION, SOLUTION INTRAMUSCULAR; INTRAVENOUS AS NEEDED
Status: DISCONTINUED | OUTPATIENT
Start: 2024-07-03 | End: 2024-07-03 | Stop reason: SURG

## 2024-07-03 RX ORDER — SENNOSIDES 8.6 MG
17.2 TABLET ORAL NIGHTLY PRN
Qty: 30 TABLET | Refills: 0 | Status: SHIPPED | OUTPATIENT
Start: 2024-07-03

## 2024-07-03 RX ORDER — LIDOCAINE HYDROCHLORIDE 10 MG/ML
INJECTION, SOLUTION EPIDURAL; INFILTRATION; INTRACAUDAL; PERINEURAL AS NEEDED
Status: DISCONTINUED | OUTPATIENT
Start: 2024-07-03 | End: 2024-07-03 | Stop reason: SURG

## 2024-07-03 RX ORDER — ACETAMINOPHEN 500 MG
1000 TABLET ORAL ONCE
Status: DISCONTINUED | OUTPATIENT
Start: 2024-07-03 | End: 2024-07-03 | Stop reason: HOSPADM

## 2024-07-03 RX ORDER — MIDAZOLAM HYDROCHLORIDE 1 MG/ML
INJECTION INTRAMUSCULAR; INTRAVENOUS AS NEEDED
Status: DISCONTINUED | OUTPATIENT
Start: 2024-07-03 | End: 2024-07-03 | Stop reason: SURG

## 2024-07-03 RX ORDER — HYDROMORPHONE HYDROCHLORIDE 1 MG/ML
0.6 INJECTION, SOLUTION INTRAMUSCULAR; INTRAVENOUS; SUBCUTANEOUS EVERY 5 MIN PRN
Status: DISCONTINUED | OUTPATIENT
Start: 2024-07-03 | End: 2024-07-03

## 2024-07-03 RX ADMIN — LIDOCAINE HYDROCHLORIDE 50 MG: 10 INJECTION, SOLUTION EPIDURAL; INFILTRATION; INTRACAUDAL; PERINEURAL at 12:35:00

## 2024-07-03 RX ADMIN — KETOROLAC TROMETHAMINE 30 MG: 30 INJECTION, SOLUTION INTRAMUSCULAR; INTRAVENOUS at 14:02:00

## 2024-07-03 RX ADMIN — METHOCARBAMOL 1000 MG: 100 INJECTION, SOLUTION INTRAMUSCULAR; INTRAVENOUS at 14:02:00

## 2024-07-03 RX ADMIN — MIDAZOLAM HYDROCHLORIDE 2 MG: 1 INJECTION INTRAMUSCULAR; INTRAVENOUS at 12:32:00

## 2024-07-03 RX ADMIN — ROCURONIUM BROMIDE 50 MG: 10 INJECTION, SOLUTION INTRAVENOUS at 12:35:00

## 2024-07-03 RX ADMIN — GLYCOPYRROLATE 0.8 MG: 0.2 INJECTION, SOLUTION INTRAMUSCULAR; INTRAVENOUS at 14:04:00

## 2024-07-03 RX ADMIN — SODIUM CHLORIDE, SODIUM LACTATE, POTASSIUM CHLORIDE, CALCIUM CHLORIDE: 600; 310; 30; 20 INJECTION, SOLUTION INTRAVENOUS at 13:41:00

## 2024-07-03 RX ADMIN — NEOSTIGMINE METHYLSULFATE 5 MG: 1 INJECTION, SOLUTION INTRAVENOUS at 14:04:00

## 2024-07-03 RX ADMIN — DEXAMETHASONE SODIUM PHOSPHATE 8 MG: 4 MG/ML VIAL (ML) INJECTION at 12:40:00

## 2024-07-03 RX ADMIN — SODIUM CHLORIDE, SODIUM LACTATE, POTASSIUM CHLORIDE, CALCIUM CHLORIDE: 600; 310; 30; 20 INJECTION, SOLUTION INTRAVENOUS at 12:32:00

## 2024-07-03 RX ADMIN — ONDANSETRON 4 MG: 2 INJECTION INTRAMUSCULAR; INTRAVENOUS at 14:02:00

## 2024-07-03 RX ADMIN — SODIUM CHLORIDE, SODIUM LACTATE, POTASSIUM CHLORIDE, CALCIUM CHLORIDE: 600; 310; 30; 20 INJECTION, SOLUTION INTRAVENOUS at 14:15:00

## 2024-07-03 NOTE — INTERVAL H&P NOTE
Pre-op Diagnosis: Lumbar disc herniation with radiculopathy [M51.16]    The above referenced H&P was reviewed by Ricardo Jones MD on 7/3/2024, the patient was examined and no significant changes have occurred in the patient's condition since the H&P was performed.  I discussed with the patient and/or legal representative the potential benefits, risks and side effects of this procedure; the likelihood of the patient achieving goals; and potential problems that might occur during recuperation.  I discussed reasonable alternatives to the procedure, including risks, benefits and side effects related to the alternatives and risks related to not receiving this procedure.  We will proceed with procedure as planned.

## 2024-07-03 NOTE — ANESTHESIA POSTPROCEDURE EVALUATION
[facility] [place] Patient Status:  Hospital Outpatient Surgery   Age/Gender 49 year old male MRN [id number]   Location [facility] SURGERY Attending Ricardo Jones MD   Hosp Day # 0 PCP Mac Norton MD       Anesthesia Post-op Note    Left Lumbar 4-5 laminotomy, discectomy    Procedure Summary       Date: 07/03/24 Room / Location:  MAIN OR  /  MAIN OR    Anesthesia Start: 1232 Anesthesia Stop: 1427    Procedures:       Left Lumbar 4-5 laminotomy, discectomy (Left: Spine Lumbar)      . (Spine Lumbar) Diagnosis:       Lumbar disc herniation with radiculopathy      (Lumbar disc herniation with radiculopathy [M51.16])    Surgeons: Ricardo Jones MD Anesthesiologist: Linwood Cano MD    Anesthesia Type: general ASA Status: 2            Anesthesia Type: general    Vitals Value Taken Time   /64 07/03/24 1427   Temp 97.4 07/03/24 1427   Pulse 99 07/03/24 1427   Resp 15 07/03/24 1427   SpO2 94 % 07/03/24 1427   Vitals shown include unfiled device data.    Patient Location: PACU    Anesthesia Type: general    Airway Patency: patent and extubated    Postop Pain Control: adequate    Mental Status: preanesthetic baseline    Nausea/Vomiting: none    Cardiopulmonary/Hydration status: stable euvolemic    Complications: no apparent anesthesia related complications    Postop vital signs: stable    Dental Exam: Unchanged from Preop    Patient to be discharged from PACU when criteria met.

## 2024-07-03 NOTE — DISCHARGE INSTRUCTIONS
Sometimes managing your health at home requires assistance.  The [facility] team has recognized your preference to use [facility].  They can be reached by phone at [phone].  The fax number for your reference is [phone].  A representative from the home health agency will contact you or your family to schedule your first visit.     Home Care Instructions  MICRODISCECTOMY/LUMBAR LAMINECTOMY    This handout will review the care you need to follow once you are home. If you have any questions or concerns, please ask your nurse or doctor. Our staff is here to help you. If you have questions after you are at home, please call the numbers listed at the end of this handout.    Activity  1. You do not need to wear your NICHOLE stockings (heavy white elastic stockings) at home.  2. You can climb stairs just try not to over-do.  3. Your may shower 3 days after your surgery day, but keep your incision covered with plastic. Avoid tub baths for the first 4 weeks after surgery and hot tubs for 6 weeks.  4. Sleep either on your back, stomach or side. You may use pillows for support placed behind your back or between your legs.  5. It is important to begin a walking program as soon as you leave the hospital.  6. You may sit for any length of time based on your comfort level. For your comfort, you should change your position every hour. If you become uncomfortable, change your position or activity.    Walking Program This is just a guideline  Day 1 (at home) Walk 1 block in the morning and 1 block in the afternoon/evening.  After Day 1: Increase your distance 1 block per day as long as it is comfortable. You should be walking 1-2 miles per day when you return for your next visit.  NOTE: If you need to lift or  an object (less than 10 lbs) from the floor, squat with your knees bent, do not bend at your waist.    Limitations  -No driving for 3 days, however you may be a passenger  -No lifting  more than 10 lbs (about 1 gallon of milk) for the first 6 weeks.  -No sports activities (except the walking program) until after your first follow-up visit.  -No sexual activity for 1 week, after that if comfortable while lying flat on your back.    Return to Work  Your return to work will depend on your recovery and the type of work you do. You must discuss this with your doctor before you return to work.    Incision care  Caring for your incision at home is important to prevent infection. Please follow the steps below on incision care:    -If you have a dressing over your incision, you may remove it when at home unless otherwise instructed by a nurse or a doctor.  -Your incision has been closed with suture material under the skin and covered with steri-strips (small pieces of surgical tape) on the skin. The steri-strips will gradually peel off as they get wet when you take a shower. This is normal and expected.  -Take your temperature twice a day for 1 week.    Call your doctor if you have any of the following:  -A temperature of 101 degrees F (38.3 degrees C) or greater on 2 readings taken 4 hours apart.  -An increase in pain, redness or swelling around your incision.  -Clear Drainage from your incision.  -Increase in drainage from the incision.    Pain Management  It is not unusual after surgery, during the healing process to experience occasional pain, numbness, tingling or weakness in you back or legs. If you experience pain once you have returned home, there are several things you can do to try to decrease the pain. First use ice and try decreasing your activity for 1-2 days.    Ice Method  If you have been provided a cooling device, use it while in bed or sitting. Ice the area for 20 minutes every hour for 4 consecutive hours (especially in the evening). Do not put the ice directly on your skin. Use a ready-made ice pack or put ice in a plastic bag and then wrap pack or bag in a towel before you use it. You  may also need to use pain medicine. If you do need pain medicine, take the medicine you were prescribed as directed. Do not increase the pain medicine dosage without first contacting your doctor or nurse. If you were not given a prescription, you can take Tylenol, or an anti-inflammatory pain medicine such as Ibuprofen, as long as you do not have a history of gastric (stomach) or peptic ulcer disease, kidney/liver disease, or bleeding problems. Eventually you should no longer need to use pain medicine. However, if your pain persists or you have no control over your bowel or bladder you should call the clinic as soon as possible.    Future Follow-Up visits  1st Post-op visit: Call the office to make your first [facility] This usually occurs 2 weeks after your surgical date. All other follow-up visits will be as needed.    Medications  If you need a prescription filled:  Because your health condition may change over time, your doctor or another provider who has access to your confidential medical records reviews refill requests. By following these simple guidelines, you will avoid any delay in getting your prescription refilled  1. Contact your pharmacy at least 5 days before your prescription requires refilling. To protect your health, pharmacies will accept refill orders only from your doctor. So, when you call [facility] with your request an additional step is added. Your prescription will be filled faster if you call your pharmacy directly, they will then contact [facility].  2. Use the same pharmacy. That way, your pharmacist will have your complete prescription records and you avoid the danger of mixing medications. To make if convenient for you [facility] will work with the pharmacy of your choice. Please choose one pharmacy, and use only that pharmacy. It is an easy way to help protect your health.  3. Call your pharmacy early in the day. This gives your doctor time to review your records. To  ensure you get the right medications, we do not rush refills without making sure the order and your record are reviewed. Often, physicians are in surgery or finishing with patients late in the day, and we do not want to interrupt those patients’ visits, therefore, refill requests received after 3:00pm Monday through Thursday or after 10:00a.m. Friday will be reviewed and filled the next [facility] business day.    If you run out of medication before you request a refill:    Always call your pharmacy at least 5 days before you run out of medication. This will give the pharmacy plenty of time to contact your doctor, for the doctor to review your record, and for the pharmacist to prepare your medication. All you have to do is watch your medication levels and there should be no reason for you to run out of medication. It is dangerous to “rush” an order. For your protection, refills are not done at night, over the weekend, on holidays or as an emergency if you have forgotten to call your pharmacy.    If you run out of your medication early:    Your doctor ordered your prescription according to a precise dosage for a prescribed amount of days. If you run out early, it may mean you are experiencing some difficulty with your dosage or medication. Call [facility] immediately and explain your difficulty. If your prescription is lost, misplaced or stolen:    To protect your health, lost or stolen prescriptions are replaced only with a physician visit. To avoid possible duplication of prescriptions when an order is lost or stolen, you will want to talk with your doctor. Call [facility] to schedule an appointment.    [facility]        You have been given a prescription for oxycodone  Norco was given to you at: 4:45PM  Next dose no sooner than four hours after first dose  Take this medication as directed    Oxycodone is a Narcotic and can be constipating or upset your stomach  Don't take oxycodone on an empty  stomach  Drink plenty of water  Alcoholic beverages should be avoided while taking narcotics

## 2024-07-03 NOTE — BRIEF OP NOTE
Pre-Operative Diagnosis: Lumbar disc herniation with radiculopathy [M51.16]     Post-Operative Diagnosis: Lumbar disc herniation with radiculopathy [M51.16]      Procedure Performed:   Left Lumbar 4-5 laminotomy, discectomy    Surgeons and Role:     * Ricardo Jones MD - Primary    Assistant(s):  APN: Stacey Segura APRN     Surgical Findings: lumbar stenosis     Specimen: none     Estimated Blood Loss: Blood Output: 10 mL (7/3/2024  2:09 PM)      Plan    Antibiotics:  complete  Anticoagulation: SCDs and early ambulation  Drain: None  Activity: Out of bed as tolerated  Brace: None  Medical history, allergies, and medications:In EPIC  Pain control: Routine postop  Anticipated discharge: When pain control and activity tolerance allow. Should be  today  Consults: none  Other information: None        OSCAR Bermeo  7/3/2024  2:24 PM

## 2024-07-03 NOTE — ANESTHESIA PROCEDURE NOTES
Airway  Date/Time: 7/3/2024 12:37 PM  Urgency: elective    Airway not difficult    General Information and Staff    Patient location during procedure: OR  Anesthesiologist: Linwood Cano MD  Resident/CRNA: Nathaniel Mijares CRNA  Performed: CRNA   Performed by: Nathaniel Mijares CRNA  Authorized by: Linwood Cano MD      Indications and Patient Condition  Indications for airway management: anesthesia  Spontaneous Ventilation: absent  Sedation level: deep  Preoxygenated: yes  Patient position: sniffing  MILS maintained throughout  Mask difficulty assessment: 1 - vent by mask  Planned trial extubation    Final Airway Details  Final airway type: endotracheal airway      Successful airway: ETT  Cuffed: yes   Successful intubation technique: Video laryngoscopy  Facilitating devices/methods: intubating stylet  Endotracheal tube insertion site: oral  Blade: GlideScope  Blade size: #3  ETT size (mm): 7.5    Cormack-Lehane Classification: grade I - full view of glottis  Placement verified by: capnometry   Cuff volume (mL): 7  Measured from: lips  ETT to lips (cm): 22  Number of attempts at approach: 1  Number of other approaches attempted: 0    Additional Comments  Dentition per pre op

## 2024-07-04 NOTE — OPERATIVE REPORT
SURGEON: Ricardo Jones MD    DATE OF SURGERY: 7/3/2024    PREOPERATIVE DIAGNOSIS:   Lumbar stenosis with radiculopathy    POSTOPERATIVE DIAGNOSIS:   Lumbar stenosis with radiculopathy    NAME OF OPERATION:  1. Left L4-5 laminotomy, medial facetectomy and microdiscectomy (08863)    ANESTHESIA: General endotracheal.     ESTIMATED BLOOD LOSS: 10 mL.     DISPOSITION: Stable, extubated to PACU.     INDICATIONS: This patient is a 49 year old-year-old male with a long-standing history of lumbar radiculopathy and has failed non operative management. Imaging demonstrated canal stenosis at L4-5 with broad disc bulge and moderate lateral recess stenosis.  After discussing the risks and benefits of surgery and of non operative treatment, the patient elected for the above procedure.  Please see clinic notes for details. Prior to surgery I explained in detail the risks of surgery including: risk of nerve injury, persistent and or worsening pain, spinal fluid leak, infection or meningitis, excessive bleeding, paralysis, death, blindness, sexual dysfunction, blood vessel injury, injury to neighboring organs, need for further surgery, bowel and bladder dysfunction, spinal instability, and autonomic nervous system dysfunction as well as unforeseen medical and surgical complications. An understanding that in general spinal surgery is more predictive in improving extremity discomfort than axial spine pain was stressed.          DESCRIPTION OF PROCEDURE: The patient was identified in the preoperative holding area. The site of surgery and consent verified with the patient. Patient was then brought to the Operating Room. General anesthesia was administered. The patient was intubated without difficulty. NICHOLE and SCD stockings were placed for mechanical DVT prophylaxis. He was then positioned prone onto a Doc table with bony prominences well padded.  Abdomen was hanging free. His back was prepped and draped in the usual sterile standard  fashion. A hard stop surgical timeout performed with all members of the Operating Room staff, and IV Ancef given prior to surgical start.         The incision was localized with a lateral fluoroscopic image.  The lumbar spine was then prepped and draped in the standard sterile fashion.  An incision was made in the skin over the intended surgical levels and dissection was carried down through the subcutaneous tissue down to the level of the deep fascia. The deep fascia was elevated off the posterior elements in a subperiosteal manner.  An intra-operative fluoroscopic image was taken to confirm the appropriate spinal level.  At this time a Butler retractor was placed and the operating microscope was draped and brought into the field.      With a high speed sada, a laminotomy and medial facetectomy was performed at left L4 lamina. A curette was used to remove, in a subperiosteal manner, the attachment of the ligamentum flavum from the undersurface of the superior lamina. The ligamentum flavum was then dissected free of its attachment to the pars interarticularis, the medial articular facet and the inferior lamina.  The lateral dura and the traversing nerve root were exposed, and this also allowed for palpation of the pedicle and neural foramen.  After obtaining meticulous hemostasis using a bipolar cautery, the shoulder of the traversing nerve root was mobilized and the disk space was exposed.     A nerve root retractor was placed lateral to the traversing nerve root to protect the neural structures. A bipolar was used for hemostasis and was used on top of the disc space as further confirmation there were no neural structures over the disc space. The protruded disc was found to be calcified. A 15-blade on a long handle was used to make an annulotomy in line with the nerve root. A micropituitary was used to grab and remove herniated fragment and an angle currette was also use to push the extruded calcified disc into  the disc space.. A nerve hook was used in the disc space to free up any unstable disc material. A micropituitary was used to remove small fragments of disc.     A Penfield 4 and Emmet was then used to palpate ventral to the dura medially, distally, proximally and laterally and all loose disk fragments were removed.  The disk space and floor of the canal were flushed with saline to free up loose fragments.  At this time a foraminotomy was performed at the L5 foramen to ensure complete decompression of the nerve root.  The nerve root and canal were then checked again with a Chamberlain probe.  Being convinced that the nerve root was completely free and mobile, the wound was copiously irrigated and meticulous hemostasis was obtained. The retractors were removed and the side walls were again inspected to ensure no bleeding vessels remained.     The fascial layer was closed with 0 vicryl sutures in interrupted, figure-of-8 fashion. The subdermal layer was closed with 2-0 vicryl suture and the skin was closed with a buried subcuticular stitch. Dermabond was placed over the incision. The patient was then carefully placed in the supine position and extubated.           I attest I was present for and performed all key and critical aspects of the procedure.    The aid of assistant Stacey Segura was needed for patient positioning, preparation, drape, retraction,  wound closure, dressing application and critical portions of the procedure.

## 2024-07-08 NOTE — TELEPHONE ENCOUNTER
July 7, 2024  Aric Jackson   to  [facility] Clinical Pool (supporting Ricardo Jones MD)   SB      7/7/24  1:31 AM  I am having some pain where the incision was made but the medication and icing does help it. I am able to walk and climb stairs slowly and have been doing the exercises that the physical therapist gave to me at home. The medication is helping a lot with the pain although the only downside is that I get very drowsy so I have been napping a lot. Other than that, the incision is healing slowly but surely!

## 2024-07-08 NOTE — TELEPHONE ENCOUNTER
Future Appointments   Date Time Provider Department Center   7/18/2024  2:30 PM Stacey Segura APRN [facility]

## 2024-07-10 ENCOUNTER — TELEPHONE (OUTPATIENT)
Facility: CLINIC | Age: 49
End: 2024-07-10

## 2024-07-18 ENCOUNTER — OFFICE VISIT (OUTPATIENT)
Dept: ORTHOPEDICS CLINIC | Facility: CLINIC | Age: 49
End: 2024-07-18
Payer: MEDICAID

## 2024-07-18 VITALS — BODY MASS INDEX: 30.75 KG/M2 | HEIGHT: 72 IN | WEIGHT: 227 LBS

## 2024-07-18 DIAGNOSIS — Z98.890 S/P LUMBAR LAMINECTOMY: Primary | ICD-10-CM

## 2024-07-18 PROCEDURE — 99024 POSTOP FOLLOW-UP VISIT: CPT | Performed by: NURSE PRACTITIONER

## 2024-07-18 RX ORDER — OXYCODONE HYDROCHLORIDE AND ACETAMINOPHEN 5; 325 MG/1; MG/1
1 TABLET ORAL EVERY 8 HOURS PRN
COMMUNITY
Start: 2023-09-21

## 2024-07-18 NOTE — PATIENT INSTRUCTIONS
Sciatic nerve glides:  This exercise will keep your nerves moving to prevent scarring around the nerves.  While seated, extend your neck (look up), straighten your knee, and dorsiflex your ankle by pointing your toes at your head. Then flex your neck (look down) and plantarflex your ankle by pointing your toes away from you. Do your left side then right side. Do this 12 times on each side.  Do this exercise 3 times per day.    “Seated sciatic nerve floss” (Howie, 2021)

## 2024-07-25 ENCOUNTER — PATIENT MESSAGE (OUTPATIENT)
Dept: ORTHOPEDICS CLINIC | Facility: CLINIC | Age: 49
End: 2024-07-25

## 2024-07-25 DIAGNOSIS — M54.16 LEFT LUMBAR RADICULOPATHY: ICD-10-CM

## 2024-07-25 DIAGNOSIS — Z98.890 S/P LUMBAR LAMINECTOMY: Primary | ICD-10-CM

## 2024-07-25 DIAGNOSIS — M51.16 LUMBAR DISC HERNIATION WITH RADICULOPATHY: ICD-10-CM

## 2024-07-26 NOTE — TELEPHONE ENCOUNTER
From: Aric Jackson  To: Stacey Segura  Sent: 7/25/2024 5:27 PM CDT  Subject: Physical Therapy    Hello,    I realized that the [facility] physical therapy locations are very far from me. I would like to instead go to [facility] in [place]. Would I need a referral? I think it might be easier to have one but if it is not needed I can go ahead and schedule my appointment there.

## 2024-08-06 ENCOUNTER — MED REC SCAN ONLY (OUTPATIENT)
Dept: ORTHOPEDICS CLINIC | Facility: CLINIC | Age: 49
End: 2024-08-06

## 2024-08-12 ENCOUNTER — PATIENT MESSAGE (OUTPATIENT)
Dept: INTERNAL MEDICINE CLINIC | Facility: CLINIC | Age: 49
End: 2024-08-12

## 2024-08-12 DIAGNOSIS — E11.9 TYPE 2 DIABETES MELLITUS WITHOUT COMPLICATION, WITHOUT LONG-TERM CURRENT USE OF INSULIN (HCC): ICD-10-CM

## 2024-08-12 DIAGNOSIS — E11.69 HYPERLIPIDEMIA ASSOCIATED WITH TYPE 2 DIABETES MELLITUS (HCC): ICD-10-CM

## 2024-08-12 DIAGNOSIS — E78.5 HYPERLIPIDEMIA ASSOCIATED WITH TYPE 2 DIABETES MELLITUS (HCC): ICD-10-CM

## 2024-08-14 NOTE — TELEPHONE ENCOUNTER
From: Aric Jackson  To: Mac Norton  Sent: 8/12/2024 8:11 PM CDT  Subject: Atorvastatin    Hi Dr. Norton,    I am running out of the 10 mg Atorvastatin Calcium that my previous doctor prescribed. I tried the 20 mg one you prescribed me but I don’t think it makes much of a difference for me.

## 2024-08-17 RX ORDER — ATORVASTATIN CALCIUM 10 MG/1
10 TABLET, FILM COATED ORAL NIGHTLY
Qty: 90 TABLET | Refills: 3 | Status: SHIPPED | OUTPATIENT
Start: 2024-08-17

## 2024-08-17 NOTE — TELEPHONE ENCOUNTER
Routed to Dr Norton for advise, see above mychart message thanks.      Requested Prescriptions     Pending Prescriptions Disp Refills    atorvastatin 10 MG Oral Tab 90 tablet 3     Sig: Take 1 tablet (10 mg total) by mouth nightly.       Last Office Visit with PCP: 6/24/2024

## 2024-08-20 ENCOUNTER — TELEPHONE (OUTPATIENT)
Dept: INTERNAL MEDICINE CLINIC | Facility: CLINIC | Age: 49
End: 2024-08-20

## 2024-08-20 DIAGNOSIS — E11.9 TYPE 2 DIABETES MELLITUS WITHOUT COMPLICATION, WITHOUT LONG-TERM CURRENT USE OF INSULIN (HCC): ICD-10-CM

## 2024-08-20 DIAGNOSIS — E11.69 HYPERLIPIDEMIA ASSOCIATED WITH TYPE 2 DIABETES MELLITUS (HCC): ICD-10-CM

## 2024-08-20 DIAGNOSIS — E78.5 HYPERLIPIDEMIA ASSOCIATED WITH TYPE 2 DIABETES MELLITUS (HCC): ICD-10-CM

## 2024-08-20 RX ORDER — ATORVASTATIN CALCIUM 10 MG/1
10 TABLET, FILM COATED ORAL NIGHTLY
Qty: 90 TABLET | Refills: 3 | Status: SHIPPED | OUTPATIENT
Start: 2024-08-20

## 2024-09-05 DIAGNOSIS — E11.9 TYPE 2 DIABETES MELLITUS WITHOUT COMPLICATION, WITHOUT LONG-TERM CURRENT USE OF INSULIN (HCC): ICD-10-CM

## 2024-09-08 NOTE — TELEPHONE ENCOUNTER
Refill passed per [facility] protocol.    Requested Prescriptions   Pending Prescriptions Disp Refills    METFORMIN HCL 1000 MG Oral Tab [Pharmacy Med Name: METFORMIN 1000MG TABLETS] 180 tablet 1     Sig: TAKE 1 TABLET(1000 MG) BY MOUTH TWICE DAILY WITH MEALS       Diabetes Medication Protocol Passed - 9/5/2024  2:49 PM        Passed - Last A1C < 7.5 and within past 6 months     Lab Results   Component Value Date    A1C 7.5 (A) 06/21/2024             Passed - In person appointment or virtual visit in the past 6 mos or appointment in next 3 mos     Recent Outpatient Visits              1 month ago S/P lumbar laminectomy    [address] Stacey Segura APRN    Office Visit    [facility]    Office Visit    2 months ago Type 2 diabetes mellitus with other specified complication, unspecified whether long term insulin use (HCC)    [facility]    2 months ago [facility]counter to [facility] doctor    [facility]    Office Visit    2 months ago Encounter for education    [facility]    Nurse Only          Future Appointments         Provider Department Appt Notes    In 4 days Stacey Segura APRN [address] post op 2 for sciatica                    Passed - Microalbumin procedure in past 12 months or taking ACE/ARB        Passed - EGFRCR or GFRNAA > 50     GFR Evaluation  EGFRCR: 92 , resulted on 6/21/2024          Passed - GFR in the past 12 months             Future Appointments         Provider Department Appt Notes    In 4 days Stacey Segura APRN [address] post op 2 for sciatica            Recent Outpatient Visits               1 month ago S/P lumbar laminectomy    [facility], [address] [place] [address], Stacey Carmona APRN    Office Visit    2 months ago Pre-operative examination    [facility]    Office Visit    2 months ago Type 2 diabetes mellitus with other specified complication, unspecified whether long term insulin use (HCC)    [facility]    2 months ago Encounter to establish care with new doctor    [facility]    Office Visit    2 months ago Encounter for education    [facility], [facility]    Nurse Only

## 2024-09-09 NOTE — PROGRESS NOTES
Post op follow up    CC:   Chief Complaint   Patient presents with    Post-Op     Left Lumbar 4-5 laminotomy, discectomy 7/3/24- feeling good. Has a couple of PT visits left.     HPI:   Aric Jackson is a 49 year old male with chief complaint of   Chief Complaint   Patient presents with    Post-Op     Left Lumbar 4-5 laminotomy, discectomy 7/3/24- feeling good. Has a couple of PT visits left.     Post op: 7/3/2024 Left L4-5 laminotomy, discectomy    Location is low back pain improved, radicular leg pain resolved. With numbness that is baseline at 20% increases to 40% numbness with burning and tingling on a bad day. States overall improved symptoms. Continues with left EHL weakness.   Severity is mild to moderate. Frequency of symptoms is intermittent.  Duration is post operative.  Quality numbness/burning.  Improved by time/PT and worsened by unknown.    Incision: denies concerns  Taking: OTC pain medications as needed    Denies CHEST PAIN/SOB/calf pain. Denies fever/chills/nausea/vomiting.     Current Medications:  Current Outpatient Medications   Medication Sig Dispense Refill    metFORMIN HCl 1000 MG Oral Tab Take 1 tablet (1,000 mg total) by mouth 2 (two) times daily with meals. 180 tablet 3    atorvastatin 10 MG Oral Tab Take 1 tablet (10 mg total) by mouth nightly. 90 tablet 3    acetaminophen 500 MG Oral Tab Take 2 tablets (1,000 mg total) by mouth every 8 (eight) hours as needed for Pain. 90 tablet 0    Cholecalciferol (VITAMIN D) 50 MCG (2000 UT) Oral Cap Take 1 capsule (2,000 Units total) by mouth daily. 90 capsule 3    valsartan 40 MG Oral Tab Take 1 tablet (40 mg total) by mouth daily. 90 tablet 1    Lancets (ONETOUCH DELICA PLUS [id number]) Does not apply Misc 1 strip by In Vitro route 3 (three) times daily.      ONETOUCH ULTRA In Vitro Strip 3 (three) times daily.      Blood Glucose Monitoring Suppl (ONETOUCH ULTRA 2) w/Device Does not apply Kit         HISTORY:  Past Medical History:    Back  problem    Diabetes (HCC)    High blood pressure    High cholesterol    Visual impairment    Reading glasses      Past Surgical History:   Procedure Laterality Date    Back surgery  07/03/2024    Left Lumbar 4-5 laminotomy, discectomy      Family History   Problem Relation Age of Onset    No Known Problems Mother     No Known Problems Father     Other (lipoma) Maternal Grandmother     Other (snake bite) Paternal Grandfather     No Known Problems Paternal Uncle       Social History     Socioeconomic History    Marital status:    Tobacco Use    Smoking status: Never     Passive exposure: Never    Smokeless tobacco: Never   Vaping Use    Vaping status: Never Used   Substance and Sexual Activity    Alcohol use: Not Currently    Drug use: Never    Sexual activity: Yes     Partners: Female        Exam     A&Ox4 in no acute distress. Seated on exam chair    Non Antalgic gait, able to normal heel and toe walk. Incision is well healed without drainage, swelling or erythema. Strength is 5/5 bilateral in the lower extremities, except with 3+/5 left EHL weakness. SLR is negative bilaterally, tight hamstrings bilateral. Sensation is intact to light touch to the bilateral lower extremities, decrease sensation at about 60% sensation in the left lateral thigh. Denies calf pain. No edema in the lower extremities. Dorsalis pedis pulses are intact bilaterally.     Medical Decision Making:   Impression:   1. S/P lumbar laminectomy  - PHYSICAL THERAPY EXTERNAL      Plan:  Overall improving post operative. Discontinue spine precautions. Continue activities as tolerated.   Medications: OTC pain medications as needed  Physical therapy: continue PT with spine precautions discontinued.       Review treatment plan with the patient.  Return in about 6 weeks (around 10/24/2024), or if symptoms worsen or fail to improve.    Patient educated and verbalized understanding.  The patient indicates understanding of these issues and agrees to  the plan.    Stacey Segura, DANAE-BC, [facility]  Collaborative: Ricardo Jones MD  Orthopedic Spine Surgeon  [facility]   [address], [address] [address] [address]   t: [phone]   f: [phone]

## 2024-09-12 ENCOUNTER — OFFICE VISIT (OUTPATIENT)
Dept: ORTHOPEDICS CLINIC | Facility: CLINIC | Age: 49
End: 2024-09-12
Payer: MEDICAID

## 2024-09-12 VITALS — WEIGHT: 227 LBS | HEIGHT: 72 IN | BODY MASS INDEX: 30.75 KG/M2

## 2024-09-12 DIAGNOSIS — Z98.890 S/P LUMBAR LAMINECTOMY: Primary | ICD-10-CM

## 2024-09-12 PROCEDURE — 99024 POSTOP FOLLOW-UP VISIT: CPT | Performed by: NURSE PRACTITIONER

## 2024-09-16 ENCOUNTER — OFFICE VISIT (OUTPATIENT)
Dept: INTERNAL MEDICINE CLINIC | Facility: CLINIC | Age: 49
End: 2024-09-16
Payer: MEDICAID

## 2024-09-16 VITALS
BODY MASS INDEX: 31.15 KG/M2 | DIASTOLIC BLOOD PRESSURE: 76 MMHG | HEART RATE: 90 BPM | WEIGHT: 230 LBS | HEIGHT: 72 IN | SYSTOLIC BLOOD PRESSURE: 113 MMHG

## 2024-09-16 DIAGNOSIS — Z12.11 ENCOUNTER FOR COLORECTAL CANCER SCREENING: ICD-10-CM

## 2024-09-16 DIAGNOSIS — Z12.12 ENCOUNTER FOR COLORECTAL CANCER SCREENING: ICD-10-CM

## 2024-09-16 DIAGNOSIS — M54.2 CERVICALGIA: ICD-10-CM

## 2024-09-16 DIAGNOSIS — E11.9 TYPE 2 DIABETES MELLITUS WITHOUT COMPLICATION, WITHOUT LONG-TERM CURRENT USE OF INSULIN (HCC): ICD-10-CM

## 2024-09-16 DIAGNOSIS — M25.512 ACUTE PAIN OF LEFT SHOULDER: Primary | ICD-10-CM

## 2024-09-16 PROCEDURE — 99215 OFFICE O/P EST HI 40 MIN: CPT | Performed by: STUDENT IN AN ORGANIZED HEALTH CARE EDUCATION/TRAINING PROGRAM

## 2024-09-16 RX ORDER — METHYLPREDNISOLONE 4 MG
TABLET, DOSE PACK ORAL
Qty: 1 EACH | Refills: 0 | Status: SHIPPED | OUTPATIENT
Start: 2024-09-16

## 2024-09-16 RX ORDER — BACLOFEN 10 MG/1
10 TABLET ORAL 3 TIMES DAILY PRN
Qty: 90 TABLET | Refills: 0 | Status: SHIPPED | OUTPATIENT
Start: 2024-09-16 | End: 2024-10-16

## 2024-09-16 NOTE — PROGRESS NOTES
OFFICE NOTE     Patient ID: Aric Jackson is a 49 year old male.  Today's Date: 09/16/24  Chief Complaint: Shoulder Pain (Left shoulder pain 3/10)    Pt is a 49 year old male with PMHx of Type 2 DM (Last A1c value was 7.5% done 6/21/2024) HLD (on atorvastatin 10mg) , HTN (on valsartan 40mg)  Lumbar radiculopathy s/p lumbar L4-L5 laminectomy/disectomy (Dr. Jones), now here with left shoulder pain and neck pain        Shoulder Pain   The pain is present in the neck, back and left shoulder. This is a new problem. The current episode started more than 1 month ago. There has been no history of extremity trauma. The problem occurs 2 to 4 times per day. The problem has been gradually worsening. The quality of the pain is described as sharp. Associated symptoms include a limited range of motion, numbness and stiffness. Pertinent negatives include no fever, inability to bear weight, itching, joint locking, joint swelling or tingling.         Vitals:    09/16/24 1735   BP: 113/76   Pulse: 90   Weight: 230 lb (104.3 kg)   Height: 6' (1.829 m)     body mass index is 31.19 kg/m².  BP Readings from Last 3 Encounters:   09/16/24 113/76   07/03/24 (!) 131/94   06/24/24 121/75     The 10-year ASCVD risk score (Sheryl BADILLO, et al., 2019) is: 4.6%    Values used to calculate the score:      Age: 49 years      Sex: Male      Is Non- : No      Diabetic: Yes      Tobacco smoker: No      Systolic Blood Pressure: 113 mmHg      Is BP treated: Yes      HDL Cholesterol: 45 mg/dL      Total Cholesterol: 137 mg/dL      Medications reviewed:  Current Outpatient Medications   Medication Sig Dispense Refill    methylPREDNISolone 4 MG Oral Tablet Therapy Pack Take as directed with food. 1 each 0    baclofen 10 MG Oral Tab Take 1 tablet (10 mg total) by mouth 3 (three) times daily as needed. 90 tablet 0    metFORMIN HCl 1000 MG Oral Tab Take 1 tablet (1,000 mg total) by mouth 2 (two) times daily with meals.  180 tablet 3    atorvastatin 10 MG Oral Tab Take 1 tablet (10 mg total) by mouth nightly. 90 tablet 3    Cholecalciferol (VITAMIN D) 50 MCG (2000 UT) Oral Cap Take 1 capsule (2,000 Units total) by mouth daily. 90 capsule 3    valsartan 40 MG Oral Tab Take 1 tablet (40 mg total) by mouth daily. 90 tablet 1    Lancets (ONETOUCH DELICA PLUS [id number]) Does not apply Misc 1 strip by In Vitro route 3 (three) times daily.      ONETOUCH ULTRA In Vitro Strip 3 (three) times daily.      Blood Glucose Monitoring Suppl (ONETOUCH ULTRA 2) w/Device Does not apply Kit       acetaminophen 500 MG Oral Tab Take 2 tablets (1,000 mg total) by mouth every 8 (eight) hours as needed for Pain. (Patient not taking: Reported on 9/16/2024) 90 tablet 0         Assessment & Plan    1. Acute pain of left shoulder (Primary)  -     Physical Therapy Referral - External  -     XR SHOULDER, COMPLETE (MIN 2 VIEWS), LEFT (CPT=73030); Future; Expected date: 09/16/2024  -     methylPREDNISolone; Take as directed with food.  Dispense: 1 each; Refill: 0  -     Baclofen; Take 1 tablet (10 mg total) by mouth 3 (three) times daily as needed.  Dispense: 90 tablet; Refill: 0  -     Physiatry Referral - In Network  Pt with likely left shoulder rotator cuff impingement and cervicalgia   Plan  Order x-ray of left shoulder and neck   -Start Medrol Dosepak for 6-day course and baclofen 10 mg p.o. 3 times daily as needed  Follow-up with physical therapy if no improvement after 6 weeks follow-up with physiatry for further evaluation  2. Cervicalgia  -     Physical Therapy Referral - External  -     XR CERVICAL SPINE (4VIEWS) (CPT=72050); Future; Expected date: 09/16/2024  -     methylPREDNISolone; Take as directed with food.  Dispense: 1 each; Refill: 0  -     Baclofen; Take 1 tablet (10 mg total) by mouth 3 (three) times daily as needed.  Dispense: 90 tablet; Refill: 0  -     Physiatry Referral - In Network  Pt with likely left shoulder rotator cuff impingement and  cervicalgia   Plan  Order x-ray of left shoulder and neck   -Start Medrol Dosepak for 6-day course and baclofen 10 mg p.o. 3 times daily as needed  Follow-up with physical therapy if no improvement after 6 weeks follow-up with physiatry for further evaluation    3. Encounter for colorectal cancer screening  -     GASTRO - INTERNAL  -     COLOGUARD COLON CANCER SCREENING (EXTERNAL)  -     Occult Blood, Fecal, FIT Immunoassay; Future; Expected date: 2024  4. Type 2 diabetes mellitus without complication, without long-term current use of insulin (HCC)  -     OPHTHALMOLOGY - INTERNAL  Pt retinal screening uptodate, outpatient ophthalmologist placed      Follow Up: As needed/if symptoms worsen or No follow-ups on file..     I spent 41 minutes obtaining pertitent medical history, reviewing pertinent imaging/labs and specialists notes, evaluating patient, discussing differential diagnosis' and various treatment options, reinforcing importance of compliance with treatment plan, and completing documentation.     Encounter Times  PreChartin minutes    Reviewing/Obtaining: 10 minutes      Medical Exam: 4 minutes    Plan: 5 minutes      Notes: 10 minutes    Counseling/Education: 5 minutes      Referring/Communicating: 3 minutes    Ind Interpretation:   minutes      Care Coordination:   minutes       My total time spent caring for the patient on the day of the encounter: 44 minutes.          Objective/ Results:   Physical Exam  Constitutional:       Appearance: He is well-developed.   Cardiovascular:      Rate and Rhythm: Normal rate and regular rhythm.      Heart sounds: Normal heart sounds.   Pulmonary:      Effort: Pulmonary effort is normal.      Breath sounds: Normal breath sounds.   Abdominal:      General: Bowel sounds are normal.      Palpations: Abdomen is soft.   Musculoskeletal:      Right shoulder: No tenderness. Normal range of motion.      Left shoulder: Tenderness present. Decreased range of motion.       Cervical back: Spasms and tenderness present. Decreased range of motion.      Lumbar back: Spasms and tenderness present.      Comments: Positive Spurlings, positive neers and hawkings     Skin:     General: Skin is warm and dry.   Neurological:      Mental Status: He is alert and oriented to person, place, and time.      Deep Tendon Reflexes: Reflexes are normal and symmetric.        Reviewed:    Patient Active Problem List    Diagnosis    Lipoma    Spinal stenosis of lumbar region with radiculopathy    Class 1 obesity due to excess calories without serious comorbidity with body mass index (BMI) of 31.0 to 31.9 in adult    Vitamin B12 deficiency    Vitamin D deficiency    Type 2 diabetes mellitus without complication, without long-term current use of insulin (HCC)    Hypertension associated with diabetes (HCC)    Hyperlipidemia associated with type 2 diabetes mellitus (HCC)      No Known Allergies     Social History     Socioeconomic History    Marital status:    Tobacco Use    Smoking status: Never     Passive exposure: Never    Smokeless tobacco: Never   Vaping Use    Vaping status: Never Used   Substance and Sexual Activity    Alcohol use: Not Currently    Drug use: Never    Sexual activity: Yes     Partners: Female      Review of Systems   Constitutional: Negative.  Negative for fever.   Respiratory: Negative.     Cardiovascular: Negative.    Gastrointestinal: Negative.    Musculoskeletal:  Positive for arthralgias, back pain, neck pain, neck stiffness and stiffness.   Skin: Negative.  Negative for itching.   Neurological:  Positive for numbness. Negative for tingling.     All other systems negative unless otherwise stated in ROS or HPI above.       Mac Norton MD  Internal Medicine       Call office with any questions or seek emergency care if necessary.   Patient understands and agrees to follow directions and advice.      ----------------------------------------- PATIENT  INSTRUCTIONS-----------------------------------------     There are no Patient Instructions on file for this visit.      The 21st Century Cures Act makes medical notes available to patients in the interest of transparency.  However, please be advised that this is a medical document.  It is intended as a peer to peer communication.  It is written in medical language and may contain abbreviations or verbiage that are technical and unfamiliar.  It may appear blunt or direct.  Medical documents are intended to carry relevant information, facts as evident, and the clinical opinion of the practitioner.

## 2024-09-19 ENCOUNTER — MED REC SCAN ONLY (OUTPATIENT)
Dept: ORTHOPEDICS CLINIC | Facility: CLINIC | Age: 49
End: 2024-09-19

## 2024-10-03 ENCOUNTER — MED REC SCAN ONLY (OUTPATIENT)
Dept: INTERNAL MEDICINE CLINIC | Facility: CLINIC | Age: 49
End: 2024-10-03

## 2024-11-13 ENCOUNTER — TELEPHONE (OUTPATIENT)
Dept: INTERNAL MEDICINE CLINIC | Facility: CLINIC | Age: 49
End: 2024-11-13

## 2024-11-13 DIAGNOSIS — Z12.11 COLON CANCER SCREENING: Primary | ICD-10-CM

## 2024-11-14 NOTE — TELEPHONE ENCOUNTER
Hello Please inform patient they are due for colorectal cancer screening. Given patient is above the age of +45 with average risk, and desire for non-invasive testing, order for Cologuard which detects stool sample for cancer was placed and will be shipped to your home within the next 48 hours and has a return UPS pre-paid postage to send back in the mail. Please complete this within the next week to ensure Dr. Norton is delivering High quality/value care to his patients. If you have any questions, please schedule follow up with him and he would gladly discuss.

## 2025-02-08 ENCOUNTER — TELEPHONE (OUTPATIENT)
Dept: INTERNAL MEDICINE CLINIC | Facility: CLINIC | Age: 50
End: 2025-02-08

## 2025-02-08 DIAGNOSIS — Z12.11 COLON CANCER SCREENING: Primary | ICD-10-CM

## 2025-02-08 NOTE — TELEPHONE ENCOUNTER
Hello Please inform patient they are due for colorectal cancer screening. Given patient is above the age of +45 with average risk, and desire for non-invasive testing, order for Cologuard which detects stool sample for cancer was placed and will be shipped to your home within the next 48 hours and has a return UPS pre-paid postage to send back in the mail. Please complete this within the next week to ensure Dr. Norton is delivering High quality/value care to his patients. If you have any questions, please schedule follow up with him and he would gladly discuss.     However if you have established care with another provider please call our office [phone] and will update our list to indicate you have a new provider and will gladly send records to them.

## 2025-04-02 DIAGNOSIS — E78.5 HYPERLIPIDEMIA ASSOCIATED WITH TYPE 2 DIABETES MELLITUS (HCC): ICD-10-CM

## 2025-04-02 DIAGNOSIS — E11.9 TYPE 2 DIABETES MELLITUS WITHOUT COMPLICATION, WITHOUT LONG-TERM CURRENT USE OF INSULIN (HCC): ICD-10-CM

## 2025-04-02 DIAGNOSIS — E11.69 HYPERLIPIDEMIA ASSOCIATED WITH TYPE 2 DIABETES MELLITUS (HCC): ICD-10-CM

## 2025-04-02 RX ORDER — ATORVASTATIN CALCIUM 10 MG/1
10 TABLET, FILM COATED ORAL NIGHTLY
Qty: 90 TABLET | Refills: 3 | Status: CANCELLED | OUTPATIENT
Start: 2025-04-02

## 2025-04-03 DIAGNOSIS — E11.59 HYPERTENSION ASSOCIATED WITH DIABETES (HCC): ICD-10-CM

## 2025-04-03 DIAGNOSIS — I15.2 HYPERTENSION ASSOCIATED WITH DIABETES (HCC): ICD-10-CM

## 2025-04-03 NOTE — TELEPHONE ENCOUNTER
Refill Request:    Current Outpatient Medications   Medication Sig Dispense Refill    valsartan 40 MG Oral Tab ()  Take 1 tablet (40 mg total) by mouth daily.  90 tablet       Medication was discontinued 2024    Please advise

## 2025-04-04 NOTE — TELEPHONE ENCOUNTER
Atorvastatin Year supply of refills good until 8/2025  Metformin Year supply of refills good until 9/2025

## 2025-04-08 RX ORDER — VALSARTAN 40 MG/1
40 TABLET ORAL DAILY
Qty: 90 TABLET | Refills: 3 | OUTPATIENT
Start: 2025-04-08 | End: 2026-04-03

## 2025-04-08 NOTE — TELEPHONE ENCOUNTER
Refusal reason: Appt required, please call patient     [employer] message sent to patient to schedule an office visit with PCP.   Please make a phone attempt.    No future appointments.

## 2025-04-23 ENCOUNTER — PATIENT MESSAGE (OUTPATIENT)
Dept: INTERNAL MEDICINE CLINIC | Facility: CLINIC | Age: 50
End: 2025-04-23

## 2025-04-24 NOTE — TELEPHONE ENCOUNTER
Berylhart message sent.       Pharmacy    [id number] - [place] [address] [facility]  [place], [phone], [phone]          Disp Refills Start End    atorvastatin 10 MG Oral Tab 90 tablet 3 8/20/2024 --    Sig - Route: Take 1 tablet (10 mg total) by mouth nightly. - Oral    Sent to pharmacy as: Atorvastatin Calcium 10 MG Oral Tablet (Lipitor)    E-Prescribing Status: Receipt confirmed by pharmacy (8/20/2024  2:16 PM CDT)

## 2025-05-05 ENCOUNTER — OFFICE VISIT (OUTPATIENT)
Dept: INTERNAL MEDICINE CLINIC | Facility: CLINIC | Age: 50
End: 2025-05-05
Payer: MEDICAID

## 2025-05-05 VITALS
HEIGHT: 72 IN | WEIGHT: 230.81 LBS | BODY MASS INDEX: 31.26 KG/M2 | SYSTOLIC BLOOD PRESSURE: 107 MMHG | HEART RATE: 83 BPM | DIASTOLIC BLOOD PRESSURE: 70 MMHG

## 2025-05-05 DIAGNOSIS — M54.2 CERVICALGIA: ICD-10-CM

## 2025-05-05 DIAGNOSIS — M25.552 BILATERAL HIP PAIN: ICD-10-CM

## 2025-05-05 DIAGNOSIS — M51.16 INTERVERTEBRAL DISC DISORDERS WITH RADICULOPATHY, LUMBAR REGION: ICD-10-CM

## 2025-05-05 DIAGNOSIS — M25.551 BILATERAL HIP PAIN: ICD-10-CM

## 2025-05-05 DIAGNOSIS — M25.562 CHRONIC PAIN OF BOTH KNEES: ICD-10-CM

## 2025-05-05 DIAGNOSIS — M25.561 CHRONIC PAIN OF BOTH KNEES: ICD-10-CM

## 2025-05-05 DIAGNOSIS — M48.061 SPINAL STENOSIS OF LUMBAR REGION WITH RADICULOPATHY: ICD-10-CM

## 2025-05-05 DIAGNOSIS — E11.9 TYPE 2 DIABETES MELLITUS WITHOUT COMPLICATION, WITHOUT LONG-TERM CURRENT USE OF INSULIN (HCC): Primary | ICD-10-CM

## 2025-05-05 DIAGNOSIS — M54.16 SPINAL STENOSIS OF LUMBAR REGION WITH RADICULOPATHY: ICD-10-CM

## 2025-05-05 DIAGNOSIS — G89.29 CHRONIC PAIN OF BOTH KNEES: ICD-10-CM

## 2025-05-05 DIAGNOSIS — Z12.11 COLON CANCER SCREENING: ICD-10-CM

## 2025-05-05 LAB — HEMOGLOBIN A1C: 7.4 % (ref 4.3–5.6)

## 2025-05-05 RX ORDER — BACLOFEN 10 MG/1
10 TABLET ORAL 3 TIMES DAILY PRN
Qty: 90 TABLET | Refills: 2 | Status: SHIPPED | OUTPATIENT
Start: 2025-05-05 | End: 2025-08-03

## 2025-05-05 RX ORDER — GABAPENTIN 300 MG/1
300 CAPSULE ORAL 3 TIMES DAILY
Qty: 90 CAPSULE | Refills: 0 | Status: SHIPPED | OUTPATIENT
Start: 2025-05-05

## 2025-05-06 NOTE — PROGRESS NOTES
OFFICE NOTE       The following individual(s) verbally consented to be recorded using ambient AI listening technology and understand that they can each withdraw their consent to this listening technology at any point by asking the clinician to turn off or pause the recording:    Patient name: Aric Jackson  Additional names:            Patient ID: Aric Jackson is a 50 year old male.  Today's Date: 05/05/25  Chief Complaint: Pain (Patient has been feeling like this for 1 month with Left leg numbness, and shoulder pain,knee pain. )    Pain      History of Present Illness  Aric Jackson is a 50 year old male with type 2 diabetes, hypertension, and lumbar reticulopathy who presents with worsening numbness and pain in his legs and left arm.    He has a history of lumbar surgery performed approximately one year ago. Post-surgery, he experienced persistent numbness in his left leg, described as 'thirty, forty percent' numbness. Recently, this numbness has worsened, and he has begun experiencing similar symptoms in his right leg. No tingling is reported.    He reports a new onset of shooting nerve pain in his left arm, similar to the pain experienced before his lumbar surgery.    He has type 2 diabetes with a current A1c of 7.4 and is currently taking metformin twice daily. He mentions previously using a medication from [place], which he plans to discontinue. He has not been on Jardiance before.    He has a history of hyperlipidemia, hypertension, and lumbar reticulopathy. He underwent a lumbar L4, L5 laminectomy and discectomy. He reports ongoing issues with his back and has not completed recommended x-rays of his neck and shoulder from September 2024.    He mentions experiencing pain in his knees and hips, particularly in the back area, and has not undergone any recent imaging for these complaints.    He is overdue for colorectal cancer screening and has declined a colonoscopy in the  past.       Vitals:    05/05/25 1809   BP: 107/70   Pulse: 83   Weight: 230 lb 12.8 oz (104.7 kg)   Height: 6' (1.829 m)     body mass index is 31.3 kg/m².  BP Readings from Last 3 Encounters:   05/05/25 107/70   09/16/24 113/76   07/03/24 (!) 131/94     The 10-year ASCVD risk score (Sheryl BADILLO, et al., 2019) is: 4%    Values used to calculate the score:      Age: 50 years      Sex: Male      Is Non- : No      Diabetic: Yes      Tobacco smoker: No      Systolic Blood Pressure: 107 mmHg      Is BP treated: No      HDL Cholesterol: 45 mg/dL      Total Cholesterol: 137 mg/dL  Results  LABS  A1c: 7.4% (05/05/2025)       Medications reviewed:  Current Medications[1]      Assessment & Plan    1. Type 2 diabetes mellitus without complication, without long-term current use of insulin (HCC) (Primary)  -     POC Hemoglobin A1C  -     Empagliflozin; Take 1 tablet (25 mg total) by mouth daily.  Dispense: 90 tablet; Refill: 1  -     Discontinue: metFORMIN HCl; Take 1 tablet (1,000 mg total) by mouth 2 (two) times daily with meals.  Dispense: 180 tablet; Refill: 3  -     metFORMIN HCl; Take 1 tablet (1,000 mg total) by mouth 2 (two) times daily with meals.  Dispense: 180 tablet; Refill: 3  2. Colon cancer screening  -     COLOGUARD COLON CANCER SCREENING (EXTERNAL)  3. Spinal stenosis of lumbar region with radiculopathy  -     PHYSICAL THERAPY - INTERNAL  -     ORTHOPEDIC - INTERNAL  4. Cervicalgia  -     XR CERVICAL SPINE (4VIEWS) (CPT=72050); Future; Expected date: 05/05/2025  -     XR SHOULDER, COMPLETE (MIN 2 VIEWS), LEFT (CPT=73030); Future; Expected date: 05/05/2025  -     PHYSICAL THERAPY - INTERNAL  -     Gabapentin; Take 1 capsule (300 mg total) by mouth 3 (three) times daily.  Dispense: 90 capsule; Refill: 0  -     Baclofen; Take 1 tablet (10 mg total) by mouth 3 (three) times daily as needed.  Dispense: 90 tablet; Refill: 2  -     ORTHOPEDIC - INTERNAL  5. Intervertebral disc disorders with  radiculopathy, lumbar region  -     XR LUMBAR SPINE (MIN 4 VIEWS) (CPT=72110); Future; Expected date: 05/05/2025  -     PHYSICAL THERAPY - INTERNAL  -     Gabapentin; Take 1 capsule (300 mg total) by mouth 3 (three) times daily.  Dispense: 90 capsule; Refill: 0  -     Baclofen; Take 1 tablet (10 mg total) by mouth 3 (three) times daily as needed.  Dispense: 90 tablet; Refill: 2  -     ORTHOPEDIC - INTERNAL  6. Bilateral hip pain  -     XR HIP W OR WO PELVIS 3 OR 4 VIEWS, BILAT(CPT=73522); Future; Expected date: 05/05/2025  -     PHYSICAL THERAPY - INTERNAL  -     Gabapentin; Take 1 capsule (300 mg total) by mouth 3 (three) times daily.  Dispense: 90 capsule; Refill: 0  -     Baclofen; Take 1 tablet (10 mg total) by mouth 3 (three) times daily as needed.  Dispense: 90 tablet; Refill: 2  -     ORTHOPEDIC - INTERNAL  7. Chronic pain of both knees  -     XR KNEE (3 VIEWS), LEFT (CPT=73562); Future; Expected date: 05/05/2025  -     XR KNEE (3 VIEWS), RIGHT (CPT=73562); Future; Expected date: 05/05/2025  -     PHYSICAL THERAPY - INTERNAL  -     Gabapentin; Take 1 capsule (300 mg total) by mouth 3 (three) times daily.  Dispense: 90 capsule; Refill: 0  -     Baclofen; Take 1 tablet (10 mg total) by mouth 3 (three) times daily as needed.  Dispense: 90 tablet; Refill: 2  -     ORTHOPEDIC - INTERNAL    Assessment & Plan  Type 2 diabetes mellitus  A1c at 7.4 indicates suboptimal control. Emphasized achieving A1c below 6.5 to prevent complications. Discussed Jardiance for glycemic control, renal protection, and heart failure risk reduction.  - Continue metformin twice daily.  - Add Jardiance to regimen. Discussed potential side effects including genital mycotic infections and importance of hydration.  - Order blood work and ophthalmologic examination during next physical.  - Schedule follow-up to assess diabetes control and adjust treatment as necessary.    Lumbar radiculopathy  Worsening numbness in left leg post-surgery, now  affecting right leg, suggests ongoing nerve compression. Coordination with Dr. Gastelum necessary for potential revision surgery. Diabetes control crucial for surgical outcomes.  - Order x-rays of lumbar spine, hips, knees, cervical spine, and left shoulder.  - Start gabapentin at night for neuropathic pain.  - Prescribe baclofen 10 mg as needed for muscle spasticity, cautioning about sedation.  - Refer to physical therapy to initiate process for potential MRI approval.  - Contact Dr. Gastelum to discuss potential revision surgery.    Cervical radiculopathy  New onset of shooting nerve pain in left arm suggests cervical involvement, possibly related to diabetes.  - Order x-rays of the cervical spine.  - Start gabapentin at night for neuropathic pain.  - Refer to physical therapy.    Lumbar laminectomy  Post-surgical numbness in left leg persists, with new symptoms in right leg. Coordination with Dr. Gastelum necessary for potential revision surgery. Diabetes control essential for surgical outcomes.  - Contact Dr. Jones to discuss potential revision surgery.  - Order x-rays of lumbar spine, hips, knees, cervical spine, and left shoulder.  - Refer to physical therapy.    Hypertension  Blood pressure well-controlled.    General Health Maintenance  Overdue for colorectal cancer screening. Discussed options including Cologuard and colonoscopy, emphasizing importance of screening.  - Order Cologuard for colorectal cancer screening.  - Schedule annual physical for June 23, 2025.  - Ensure completion of ophthalmologic examination.    Follow-up  Plans discussed to ensure continuity of care and monitoring of conditions.  - Schedule follow-up appointment for June 23, 2025, for annual physical and diabetes management.  - Coordinate with Dr. Gastelum for potential revision surgery.  - Ensure completion of ordered x-rays and physical therapy referral.       Follow Up: As needed/if symptoms worsen or Return in about 7 weeks (around 6/23/2025) for  Annual Physical ..     Objective/ Results:   Physical Exam  Vitals reviewed.   Constitutional:       Appearance: He is well-developed.   HENT:      Head: Normocephalic and atraumatic.   Eyes:      Extraocular Movements: Extraocular movements intact.      Pupils: Pupils are equal, round, and reactive to light.   Cardiovascular:      Rate and Rhythm: Normal rate and regular rhythm.      Heart sounds: Normal heart sounds.   Pulmonary:      Effort: Pulmonary effort is normal.      Breath sounds: Normal breath sounds.   Abdominal:      General: Bowel sounds are normal.      Palpations: Abdomen is soft.      Tenderness: There is no abdominal tenderness.   Musculoskeletal:      Cervical back: Spasms and tenderness present.      Lumbar back: Spasms and tenderness present. Decreased range of motion.      Right hip: Tenderness present.      Left hip: Tenderness present.      Right knee: Decreased range of motion. Tenderness present.      Left knee: Decreased range of motion. Tenderness present.   Feet:      Right foot:      Protective Sensation: 5 sites tested.  5 sites sensed.      Skin integrity: Skin integrity normal.      Left foot:      Protective Sensation: 5 sites tested.  5 sites sensed.      Skin integrity: Skin integrity normal.      Comments: Bilateral barefoot skin diabetic exam is normal, visualized feet and the appearance is normal.  Bilateral monofilament/sensation of both feet is normal.  Pulsation pedal pulse exam of both lower legs/feet is normal as well.       Skin:     General: Skin is warm and dry.   Neurological:      Mental Status: He is alert and oriented to person, place, and time.      Deep Tendon Reflexes: Reflexes are normal and symmetric.        Physical Exam         Reviewed:    Patient Active Problem List    Diagnosis    Lipoma    Spinal stenosis of lumbar region with radiculopathy    Class 1 obesity due to excess calories without serious comorbidity with body mass index (BMI) of 31.0 to 31.9 in  adult    Vitamin B12 deficiency    Vitamin D deficiency    Type 2 diabetes mellitus without complication, without long-term current use of insulin (HCC)    Hypertension associated with diabetes (HCC)    Hyperlipidemia associated with type 2 diabetes mellitus (HCC)      Allergies[2]   Short Social Hx on File[3]   Review of Systems    All other systems negative unless otherwise stated in ROS or HPI above.       Mac Norton MD  Internal Medicine       Call office with any questions or seek emergency care if necessary.   Patient understands and agrees to follow directions and advice.      ----------------------------------------- PATIENT INSTRUCTIONS-----------------------------------------     There are no Patient Instructions on file for this visit.        The 21st Century Cures Act makes medical notes available to patients in the interest of transparency.  However, please be advised that this is a medical document.  It is intended as a peer to peer communication.  It is written in medical language and may contain abbreviations or verbiage that are technical and unfamiliar.  It may appear blunt or direct.  Medical documents are intended to carry relevant information, facts as evident, and the clinical opinion of the practitioner.          [1]   Current Outpatient Medications   Medication Sig Dispense Refill    empagliflozin (JARDIANCE) 25 MG Oral Tab Take 1 tablet (25 mg total) by mouth daily. 90 tablet 1    gabapentin 300 MG Oral Cap Take 1 capsule (300 mg total) by mouth 3 (three) times daily. 90 capsule 0    baclofen 10 MG Oral Tab Take 1 tablet (10 mg total) by mouth 3 (three) times daily as needed. 90 tablet 2    metFORMIN HCl 1000 MG Oral Tab Take 1 tablet (1,000 mg total) by mouth 2 (two) times daily with meals. 180 tablet 3    atorvastatin 10 MG Oral Tab Take 1 tablet (10 mg total) by mouth nightly. 90 tablet 3    Cholecalciferol (VITAMIN D) 50 MCG (2000 UT) Oral Cap Take 1 capsule (2,000 Units total) by  mouth daily. 90 capsule 3    Lancets (ONETOUCH DELICA PLUS [id number]) Does not apply Misc 1 strip by In Vitro route 3 (three) times daily.      ONETOUCH ULTRA In Vitro Strip 3 (three) times daily.      Blood Glucose Monitoring Suppl (ONETOUCH ULTRA 2) w/Device Does not apply Kit       methylPREDNISolone 4 MG Oral Tablet Therapy Pack Take as directed with food. (Patient not taking: Reported on 5/5/2025) 1 each 0    acetaminophen 500 MG Oral Tab Take 2 tablets (1,000 mg total) by mouth every 8 (eight) hours as needed for Pain. (Patient not taking: Reported on 5/5/2025) 90 tablet 0   [2] No Known Allergies  [3]   Social History  Socioeconomic History    Marital status:    Tobacco Use    Smoking status: Never     Passive exposure: Never    Smokeless tobacco: Never   Vaping Use    Vaping status: Never Used   Substance and Sexual Activity    Alcohol use: Not Currently    Drug use: Never    Sexual activity: Yes     Partners: Female

## 2025-05-07 ENCOUNTER — HOSPITAL ENCOUNTER (OUTPATIENT)
Dept: GENERAL RADIOLOGY | Age: 50
Discharge: HOME | End: 2025-05-07
Attending: STUDENT IN AN ORGANIZED HEALTH CARE EDUCATION/TRAINING PROGRAM
Payer: MEDICAID

## 2025-05-07 DIAGNOSIS — M51.16 INTERVERTEBRAL DISC DISORDERS WITH RADICULOPATHY, LUMBAR REGION: ICD-10-CM

## 2025-05-07 DIAGNOSIS — M25.562 CHRONIC PAIN OF BOTH KNEES: ICD-10-CM

## 2025-05-07 DIAGNOSIS — M25.561 CHRONIC PAIN OF BOTH KNEES: ICD-10-CM

## 2025-05-07 DIAGNOSIS — G89.29 CHRONIC PAIN OF BOTH KNEES: ICD-10-CM

## 2025-05-07 DIAGNOSIS — M54.2 CERVICALGIA: ICD-10-CM

## 2025-05-07 DIAGNOSIS — M25.551 BILATERAL HIP PAIN: ICD-10-CM

## 2025-05-07 DIAGNOSIS — M25.552 BILATERAL HIP PAIN: ICD-10-CM

## 2025-05-07 PROCEDURE — 73562 X-RAY EXAM OF KNEE 3: CPT | Performed by: STUDENT IN AN ORGANIZED HEALTH CARE EDUCATION/TRAINING PROGRAM

## 2025-05-07 PROCEDURE — 72050 X-RAY EXAM NECK SPINE 4/5VWS: CPT | Performed by: STUDENT IN AN ORGANIZED HEALTH CARE EDUCATION/TRAINING PROGRAM

## 2025-05-07 PROCEDURE — 73030 X-RAY EXAM OF SHOULDER: CPT | Performed by: STUDENT IN AN ORGANIZED HEALTH CARE EDUCATION/TRAINING PROGRAM

## 2025-05-07 PROCEDURE — 72110 X-RAY EXAM L-2 SPINE 4/>VWS: CPT | Performed by: STUDENT IN AN ORGANIZED HEALTH CARE EDUCATION/TRAINING PROGRAM

## 2025-05-07 PROCEDURE — 73522 X-RAY EXAM HIPS BI 3-4 VIEWS: CPT | Performed by: STUDENT IN AN ORGANIZED HEALTH CARE EDUCATION/TRAINING PROGRAM

## 2025-05-19 ENCOUNTER — TELEPHONE (OUTPATIENT)
Dept: INTERNAL MEDICINE CLINIC | Facility: CLINIC | Age: 50
End: 2025-05-19

## 2025-05-19 DIAGNOSIS — M51.16 INTERVERTEBRAL DISC DISORDERS WITH RADICULOPATHY, LUMBAR REGION: ICD-10-CM

## 2025-05-19 DIAGNOSIS — M25.561 CHRONIC PAIN OF BOTH KNEES: ICD-10-CM

## 2025-05-19 DIAGNOSIS — M25.562 CHRONIC PAIN OF BOTH KNEES: ICD-10-CM

## 2025-05-19 DIAGNOSIS — M54.2 CERVICALGIA: ICD-10-CM

## 2025-05-19 DIAGNOSIS — G89.29 CHRONIC PAIN OF BOTH KNEES: ICD-10-CM

## 2025-05-19 DIAGNOSIS — M48.061 SPINAL STENOSIS OF LUMBAR REGION WITH RADICULOPATHY: Primary | ICD-10-CM

## 2025-05-19 DIAGNOSIS — M25.551 BILATERAL HIP PAIN: ICD-10-CM

## 2025-05-19 DIAGNOSIS — M25.552 BILATERAL HIP PAIN: ICD-10-CM

## 2025-05-19 DIAGNOSIS — M25.512 ACUTE PAIN OF LEFT SHOULDER: ICD-10-CM

## 2025-05-19 DIAGNOSIS — M54.16 SPINAL STENOSIS OF LUMBAR REGION WITH RADICULOPATHY: Primary | ICD-10-CM

## 2025-05-19 NOTE — TELEPHONE ENCOUNTER
Dr Norton ===ANUSHAI, there is no need to order it. RN approve  it under your name, thanks.     Daughter is requesting an external Physical therapy referral for ATI .   Current order is for  internal .       RN approved External Physical therapy referral , since DR Norton already signed the internal referral.   Internal Physical therapy referral cancelled by RN .

## 2025-06-28 ENCOUNTER — TELEPHONE (OUTPATIENT)
Dept: INTERNAL MEDICINE CLINIC | Facility: CLINIC | Age: 50
End: 2025-06-28

## 2025-06-28 DIAGNOSIS — E11.9 TYPE 2 DIABETES MELLITUS WITHOUT COMPLICATION, WITHOUT LONG-TERM CURRENT USE OF INSULIN (HCC): Primary | ICD-10-CM

## 2025-06-29 NOTE — TELEPHONE ENCOUNTER
Bisi Jackson    This is Dr. Norton  I am reaching out in regards to remind you that  you are due for Annual Physical Exam, Diabetes Follow up, You are also due for diabetes eye exam, If you do not have cataracts or floaters we can do a retinal screening in office in our [place] location, however if you have vision problems, I can place a referral to ophthalmologist if you don't already have one. If you already do have one please make sure that they please send a  Fax of your annual retinal screen to our office fax number: [phone] , and Colorectal cancer screening: given your age of greater than 45 I highly recommend you get your colorectal cancer screening if you decline screening colonoscopy a non invasive option is a stool Cologuard test which is done every three years, For average risk patients. Please follow up in clinic and we can discuss which option is best for you. I strive for delivering High quality/value care to my patients. If you have any questions, please schedule follow up with me  If you have already had a colonoscopy or Cologuard recently completed, please send Fax the records to our office [phone] However if you have established care with another provider please call our office [phone] or send us a [employer] message and we will remove your name from our list to indicate you have a new provider and will gladly send records to them if requested. for more information,  A Video on how to collect the cologuard sample on youtube: titled: How to properly use Cologuard colon cancer at-home test kit [email or web address].com/watch?[email or web address]    Please complete this within the next month as I value providing high value evidence based medical care and prevention and would like to go over the next steps needed In your wellness endeavor.     However if you have established care with another provider please call our office [phone] or send us a [employer] message and we will  remove your name from our list to indicate you have a new provider and will gladly send records to them if requested.  However if you have HMO insurance, you will need to call them to remove him from your list and pick any in-network provider.     Wish you all the best and stay healthy  -Dr. Errol ANDRADE

## 2025-07-03 ENCOUNTER — TELEPHONE (OUTPATIENT)
Facility: LOCATION | Age: 50
End: 2025-07-03

## 2025-07-16 ENCOUNTER — PATIENT MESSAGE (OUTPATIENT)
Dept: INTERNAL MEDICINE CLINIC | Facility: CLINIC | Age: 50
End: 2025-07-16

## 2025-07-16 DIAGNOSIS — E11.59 HYPERTENSION ASSOCIATED WITH DIABETES (HCC): ICD-10-CM

## 2025-07-16 DIAGNOSIS — I15.2 HYPERTENSION ASSOCIATED WITH DIABETES (HCC): ICD-10-CM

## 2025-07-17 NOTE — TELEPHONE ENCOUNTER
Yes patient due for diabetes follow up, physical exam. She can follow up if needs meds, or free to see another provider

## 2025-07-17 NOTE — TELEPHONE ENCOUNTER
Dr. Norton,     Please advise patient requested a refill for valsartan on 4/3/2025 and was asked to schedule a follow up appointment to discuss medication. Patient was seen on 5/5/2025 in office however there is not note of continuation of valsartan in visit note.   Last noted refill was on 6/24/24.    Please advise if patient is to continue on valsartan

## 2025-07-21 RX ORDER — VALSARTAN 40 MG/1
40 TABLET ORAL DAILY
Qty: 30 TABLET | Refills: 0 | Status: SHIPPED | OUTPATIENT
Start: 2025-07-21 | End: 2026-01-17

## 2025-07-21 NOTE — TELEPHONE ENCOUNTER
Future Appointments   Date Time Provider Department Center   8/20/2025  3:30 PM Mac Norton MD [facility]     Short supply of valsartan pended for signature.

## 2025-07-31 ENCOUNTER — MED REC SCAN ONLY (OUTPATIENT)
Dept: INTERNAL MEDICINE CLINIC | Facility: CLINIC | Age: 50
End: 2025-07-31

## 2025-08-23 DIAGNOSIS — E11.59 HYPERTENSION ASSOCIATED WITH DIABETES (HCC): ICD-10-CM

## 2025-08-23 DIAGNOSIS — I15.2 HYPERTENSION ASSOCIATED WITH DIABETES (HCC): ICD-10-CM

## 2025-08-28 RX ORDER — VALSARTAN 40 MG/1
40 TABLET ORAL DAILY
Qty: 90 TABLET | Refills: 0 | Status: SHIPPED | OUTPATIENT
Start: 2025-08-28

## (undated) DEVICE — DRAPE,TOWEL,LARGE,INVISISHIELD: Brand: MEDLINE

## (undated) DEVICE — KIT HEMSTAT MTRX 8ML PORCINE GEL HUM THROM

## (undated) DEVICE — 3M™ TEGADERM™ TRANSPARENT FILM DRESSING FRAME STYLE, 1626W, 4 IN X 4-3/4 IN (10 CM X 12 CM), 50/CT 4CT/CASE: Brand: 3M™ TEGADERM™

## (undated) DEVICE — OIL CARTRIDGE: Brand: CORE, MAESTRO

## (undated) DEVICE — COVER LT HNDL RIG FOR SUR CAM DISP

## (undated) DEVICE — Device

## (undated) DEVICE — SLEEVE COMPR MD KNEE LEN SGL USE KENDALL SCD

## (undated) DEVICE — GLOVE SUR 7 SENSICARE PI PIP GRN PWD F

## (undated) DEVICE — SOLUTION IRRIG 1000ML 0.9% NACL USP BTL

## (undated) DEVICE — ZZ- DISC- NOSUB-SOLUTION RUBBING 4OZ 70% ISO ALC CLR

## (undated) DEVICE — SPONGE GZ 4X4IN COT 12 PLY TYP VII WVN C

## (undated) DEVICE — PAD,NON-ADHERENT,3X8,STERILE,LF,1/PK: Brand: MEDLINE

## (undated) DEVICE — ADHESIVE SKIN TOP FOR WND CLSR DERMBND ADV

## (undated) DEVICE — FRAZIER SUCTION INSTRUMENT 12 FR W/CONTROL VENT & OBTURATOR: Brand: FRAZIER

## (undated) DEVICE — SUT COAT VCRL+ 0 27IN UR-6 ABSRB VLT ANTIBACT

## (undated) DEVICE — DIFFUSER: Brand: CORE, MAESTRO

## (undated) DEVICE — STERILE SYNTHETIC POLYISOPRENE POWDER-FREE SURGICAL GLOVES WITH HYDROGEL COATING, SMOOTH FINISH, STRAIGHT FINGER: Brand: PROTEXIS

## (undated) DEVICE — C-ARM: Brand: UNBRANDED

## (undated) DEVICE — SUT MCRYL 3-0 27IN ABSRB UD 19MM PS-2 3/8

## (undated) DEVICE — 3.0MM PRECISION NEURO (MATCH HEAD)

## (undated) DEVICE — ELECTRODE ES 2.75IN PTFE BLDE MOD E-Z CLN

## (undated) DEVICE — SUT COAT VCRL+ 0 27IN CT-1 ABSRB VLT ANTIBACT

## (undated) DEVICE — GLOVE SUR 8 SENSICARE PI PIP GRN PWD F

## (undated) DEVICE — DRAPE,LAPAROTOMY,PCH,STERILE: Brand: MEDLINE

## (undated) DEVICE — WRAP THERAPEUTIC BACK WO GEL P

## (undated) DEVICE — SUT COAT VCRL+ 2-0 18IN CP-2 ABSRB UD ANTIBAC

## (undated) DEVICE — LAMINECTOMY CDS: Brand: MEDLINE INDUSTRIES, INC.

## (undated) DEVICE — APPLICATOR PREP 26ML CHG 2% ISO ALC 70%

## (undated) DEVICE — Device: Brand: INTELLICART™

## (undated) DEVICE — GLOVE SUR 7.5 SENSICARE PI PIP CRM PWD F

## (undated) DEVICE — COVER,MAYO STAND,STERILE: Brand: MEDLINE

## (undated) NOTE — LETTER
24    Orthopedic Surgery   Pre-Operative Clearance Request    Patient Name:   Aric Jackson             :   1975    Surgeon: Dr. Jones             Date of Surgery: 7/3/24    Surgical Procedure: Left L4-5 laminotomy, discectomy       MUST COMPLETE ALL OF THE FOLLOWING 2-3 WEEKS PRIOR TO YOUR SURGERY TO AVOID CANCELLATION, DUE TO THE RULE THEIR WILL BE NO EXCEPTIONS!      [x]  History and Physical        [x]  Medical  Clearance                     Required pre-op testing to be ordered:                        [x]  CBC W/Diff                                                                   [x]  BMP                                                                                                                                                                             [x]  PT/INR    [x]  PTT     [x]  Type and Screen                           **Please fax test results, H&P, and clearance to [phone] and to P.[facility] at [phone]**